# Patient Record
Sex: FEMALE | Race: WHITE | NOT HISPANIC OR LATINO | URBAN - METROPOLITAN AREA
[De-identification: names, ages, dates, MRNs, and addresses within clinical notes are randomized per-mention and may not be internally consistent; named-entity substitution may affect disease eponyms.]

---
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
## 2021-06-13 ENCOUNTER — HOSPITAL ENCOUNTER (EMERGENCY)
Facility: HOSPITAL | Age: 21
Discharge: LEFT BEFORE DISCHARGE | End: 2021-06-14

## 2021-06-13 VITALS — OXYGEN SATURATION: 98 % | TEMPERATURE: 97.1 F | HEART RATE: 111 BPM | RESPIRATION RATE: 16 BRPM

## 2021-06-13 LAB
BASOPHILS # BLD AUTO: 0.03 10*3/MM3 (ref 0–0.2)
BASOPHILS NFR BLD AUTO: 0.3 % (ref 0–1.5)
DEPRECATED RDW RBC AUTO: 49.4 FL (ref 37–54)
EOSINOPHIL # BLD AUTO: 0.03 10*3/MM3 (ref 0–0.4)
EOSINOPHIL NFR BLD AUTO: 0.3 % (ref 0.3–6.2)
ERYTHROCYTE [DISTWIDTH] IN BLOOD BY AUTOMATED COUNT: 13.6 % (ref 12.3–15.4)
HCT VFR BLD AUTO: 38.8 % (ref 34–46.6)
HGB BLD-MCNC: 12.9 G/DL (ref 12–15.9)
IMM GRANULOCYTES # BLD AUTO: 0.03 10*3/MM3 (ref 0–0.05)
IMM GRANULOCYTES NFR BLD AUTO: 0.3 % (ref 0–0.5)
LYMPHOCYTES # BLD AUTO: 2.1 10*3/MM3 (ref 0.7–3.1)
LYMPHOCYTES NFR BLD AUTO: 22.3 % (ref 19.6–45.3)
MCH RBC QN AUTO: 32.3 PG (ref 26.6–33)
MCHC RBC AUTO-ENTMCNC: 33.2 G/DL (ref 31.5–35.7)
MCV RBC AUTO: 97.2 FL (ref 79–97)
MONOCYTES # BLD AUTO: 0.62 10*3/MM3 (ref 0.1–0.9)
MONOCYTES NFR BLD AUTO: 6.6 % (ref 5–12)
NEUTROPHILS NFR BLD AUTO: 6.6 10*3/MM3 (ref 1.7–7)
NEUTROPHILS NFR BLD AUTO: 70.2 % (ref 42.7–76)
NRBC BLD AUTO-RTO: 0 /100 WBC (ref 0–0.2)
PLATELET # BLD AUTO: 174 10*3/MM3 (ref 140–450)
PMV BLD AUTO: 10.8 FL (ref 6–12)
RBC # BLD AUTO: 3.99 10*6/MM3 (ref 3.77–5.28)
WBC # BLD AUTO: 9.41 10*3/MM3 (ref 3.4–10.8)

## 2021-06-13 PROCEDURE — 80053 COMPREHEN METABOLIC PANEL: CPT | Performed by: EMERGENCY MEDICINE

## 2021-06-13 PROCEDURE — 85025 COMPLETE CBC W/AUTO DIFF WBC: CPT | Performed by: EMERGENCY MEDICINE

## 2021-06-13 PROCEDURE — 99211 OFF/OP EST MAY X REQ PHY/QHP: CPT

## 2021-06-13 PROCEDURE — 84702 CHORIONIC GONADOTROPIN TEST: CPT | Performed by: EMERGENCY MEDICINE

## 2021-06-13 PROCEDURE — 83690 ASSAY OF LIPASE: CPT | Performed by: EMERGENCY MEDICINE

## 2021-06-14 LAB
ALBUMIN SERPL-MCNC: 3.7 G/DL (ref 3.5–5.2)
ALBUMIN/GLOB SERPL: 1.3 G/DL
ALP SERPL-CCNC: 50 U/L (ref 39–117)
ALT SERPL W P-5'-P-CCNC: 6 U/L (ref 1–33)
ANION GAP SERPL CALCULATED.3IONS-SCNC: 9.1 MMOL/L (ref 5–15)
AST SERPL-CCNC: 10 U/L (ref 1–32)
BILIRUB SERPL-MCNC: 0.3 MG/DL (ref 0–1.2)
BUN SERPL-MCNC: 4 MG/DL (ref 6–20)
BUN/CREAT SERPL: 6.7 (ref 7–25)
CALCIUM SPEC-SCNC: 9.1 MG/DL (ref 8.6–10.5)
CHLORIDE SERPL-SCNC: 103 MMOL/L (ref 98–107)
CO2 SERPL-SCNC: 22.9 MMOL/L (ref 22–29)
CREAT SERPL-MCNC: 0.6 MG/DL (ref 0.57–1)
GFR SERPL CREATININE-BSD FRML MDRD: 127 ML/MIN/1.73
GLOBULIN UR ELPH-MCNC: 2.9 GM/DL
GLUCOSE SERPL-MCNC: 85 MG/DL (ref 65–99)
HCG INTACT+B SERPL-ACNC: NORMAL MIU/ML
LIPASE SERPL-CCNC: 18 U/L (ref 13–60)
POTASSIUM SERPL-SCNC: 3.5 MMOL/L (ref 3.5–5.2)
PROT SERPL-MCNC: 6.6 G/DL (ref 6–8.5)
SODIUM SERPL-SCNC: 135 MMOL/L (ref 136–145)

## 2021-06-14 NOTE — ED NOTES
pt to ER via PV from [facility] sent here for further treatment. pt has had blood in urine and dysuria. pt had a positive home pregnancy test several days ago. pt i nickie in triage. Triage in appropriate PPE     Dara Sun RN  06/13/21 [id number]

## 2021-06-21 ENCOUNTER — INITIAL PRENATAL (OUTPATIENT)
Dept: OBSTETRICS AND GYNECOLOGY | Facility: CLINIC | Age: 21
End: 2021-06-21

## 2021-06-21 ENCOUNTER — TELEPHONE (OUTPATIENT)
Dept: OBSTETRICS AND GYNECOLOGY | Facility: CLINIC | Age: 21
End: 2021-06-21

## 2021-06-21 VITALS — WEIGHT: 157 LBS | BODY MASS INDEX: 26.13 KG/M2 | DIASTOLIC BLOOD PRESSURE: 66 MMHG | SYSTOLIC BLOOD PRESSURE: 115 MMHG

## 2021-06-21 DIAGNOSIS — O21.9 NAUSEA AND VOMITING IN PREGNANCY: ICD-10-CM

## 2021-06-21 DIAGNOSIS — Z34.91 UNCERTAIN DATES, ANTEPARTUM, FIRST TRIMESTER: ICD-10-CM

## 2021-06-21 DIAGNOSIS — Z11.3 SCREEN FOR STD (SEXUALLY TRANSMITTED DISEASE): ICD-10-CM

## 2021-06-21 DIAGNOSIS — Z34.81 ENCOUNTER FOR SUPERVISION OF OTHER NORMAL PREGNANCY IN FIRST TRIMESTER: Primary | ICD-10-CM

## 2021-06-21 DIAGNOSIS — Z13.71 SCREENING FOR GENETIC DISEASE CARRIER STATUS: ICD-10-CM

## 2021-06-21 LAB
GLUCOSE UR STRIP-MCNC: NEGATIVE MG/DL
PROT UR STRIP-MCNC: ABNORMAL MG/DL

## 2021-06-21 PROCEDURE — 99214 OFFICE O/P EST MOD 30 MIN: CPT | Performed by: OBSTETRICS & GYNECOLOGY

## 2021-06-21 RX ORDER — SWAB
1 SWAB, NON-MEDICATED MISCELLANEOUS DAILY
Qty: 90 EACH | Refills: 3 | Status: SHIPPED | OUTPATIENT
Start: 2021-06-21

## 2021-06-21 RX ORDER — PROMETHAZINE HYDROCHLORIDE 25 MG/1
25 TABLET ORAL EVERY 6 HOURS PRN
Qty: 30 TABLET | Refills: 2 | Status: SHIPPED | OUTPATIENT
Start: 2021-06-21 | End: 2021-10-27

## 2021-06-21 NOTE — PROGRESS NOTES
Initial ob visit     CC- Here for care of pregnancy     Elise Crockett is being seen today for her first obstetrical visit.  She is a 21 y.o.    9w4d gestation.     # 1 - Date: 20, Sex: Female, Weight: 2970 g (6 lb 8.8 oz), GA: 39w5d, Delivery: Vaginal, Spontaneous, Apgar1: 8, Apgar5: 9, Living: Living, Birth Comments: scale 3    # 2 - Date: None, Sex: None, Weight: None, GA: None, Delivery: None, Apgar1: None, Apgar5: None, Living: None, Birth Comments: None      Current obstetric complaints : none   Duration/severity of complaints:   Initial positive test date : 1.5 weeks ago     Location : @ home    Prior obstetric issues, potential pregnancy concerns: none  Family history of genetic issues (includes FOB): none  Prior infections concerning in pregnancy (Rash, fever in last 2 weeks): none  Varicella Hx -immunity  Prior testing for Cystic Fibrosis Carrier or Sickle Cell Trait- unknown status  Prepregnancy BMI - Body mass index is 26.13 kg/m².  Hx of HSV for patient or partner : No     Past Medical History:   Diagnosis Date   • Chlamydia        Past Surgical History:   Procedure Laterality Date   • WISDOM TOOTH EXTRACTION           Current Outpatient Medications:   •  Prenatal 28-0.8 MG tablet, Take 1 tablet by mouth Daily. Please use formulary or generic, with DHA ideal, Disp: 90 each, Rfl: 3  •  promethazine (PHENERGAN) 25 MG tablet, Take 1 tablet by mouth Every 6 (Six) Hours As Needed for Nausea or Vomiting., Disp: 30 tablet, Rfl: 2    No Known Allergies    Social History     Socioeconomic History   • Marital status: Single     Spouse name: Not on file   • Number of children: Not on file   • Years of education: Not on file   • Highest education level: Not on file   Tobacco Use   • Smoking status: Never Smoker   • Smokeless tobacco: Never Used   Substance and Sexual Activity   • Alcohol use: No   • Drug use: No   • Sexual activity: Yes     Partners: Male       Family History   Problem Relation Age of  Onset   • Colon cancer Maternal Grandfather    • Deep vein thrombosis Maternal Great-Grandmother    • Breast cancer Neg Hx    • Ovarian cancer Neg Hx    • Uterine cancer Neg Hx    • Pulmonary embolism Neg Hx        Review of systems     Constitutional : Nausea, fatigue nausea present, fatigue mild    : Vaginal bleeding, cramping no bleeding or cramping   Breast Tenderness : none   All other systems reviewed and negative        Objective    /66   Wt 71.2 kg (157 lb)   LMP 04/15/2021 (Approximate)   BMI 26.13 kg/m²       General Appearance:    Alert, cooperative, in no acute distress, habitus Normal   Head:    Normocephalic, without obvious abnormality, atraumatic   Eyes:            Lids and lashes normal, conjunctivae and sclerae normal, no   icterus, no pallor, corneas clear   Ears:    Ears appear intact with no abnormalities noted       Neck:   No adenopathy, supple, trachea midline, no thyromegaly   Back:     No kyphosis present, no scoliosis present,                       Lungs:     Clear to auscultation,respirations regular, even and     unlabored    Heart:    Regular rhythm and normal rate, normal S1 and S2, no            murmur, no gallop, no rub, no click   Breast Exam:    No masses, No nipple discharge   Abdomen:     Normal bowel sounds, no masses, no organomegaly, soft        non-tender, non-distended, no guarding, no rebound                 tenderness   Genitalia:    Vulva - BUS-WNL, NEFG    Vagina - No discharge, No bleeding    Cervix - No Lesions, closed     Uterus - Consistent with 8-9 weeks, anteverted     Adnexa - No mass, NT    Pelvimetry - clinically adequate, gynecoid pelvis     Extremities:   Moves all extremities well, no edema, no cyanosis, no              redness   Pulses:   Pulses palpable and equal bilaterally   Skin:   No bleeding, bruising or rash   Lymph nodes:   No palpable adenopathy   Neurologic:   Sensation intact, A&O times 3      Assessment & Plan     Diagnoses and all  orders for this visit:    1. Encounter for supervision of other normal pregnancy in first trimester (Primary)  -     OB Panel With HIV  -     Urine Culture - , Urine, Clean Catch    2. Uncertain dates, antepartum, first trimester  -      Ob Transvaginal    3. Nausea and vomiting in pregnancy    4. Screen for STD (sexually transmitted disease)  -     Chlamydia trachomatis, Neisseria gonorrhoeae, Trichomonas vaginalis, PCR - Swab, Vagina    5. Screening for genetic disease carrier status  -     Inher[employer] Society Guided - Blood,    Other orders  -     promethazine (PHENERGAN) 25 MG tablet; Take 1 tablet by mouth Every 6 (Six) Hours As Needed for Nausea or Vomiting.  Dispense: 30 tablet; Refill: 2  -     Prenatal 28-0.8 MG tablet; Take 1 tablet by mouth Daily. Please use formulary or generic, with DHA ideal  Dispense: 90 each; Refill: 3        1) Pregnancy at 9w4d  2) Nausea in pregnancy   Trial of phenergan   Discussed evolution of nausea in first trimester   3) Uncertain dates   Check dating scan        Activity recommendation : 150 minutes/week of moderate intensity aerobic activity unless we limit for bleeding, hypertension or other pregnancy complication   Patient is on Prenatal vitamins  Problem list reviewed and updated.  Reviewed routine prenatal care with the office to include but not limited to   Zika (travel restrictions/ok to use insect repellant), not to changing cat litter, food restrictions, avoidance of alcohol, tobacco and drugs and saunas/hot tubs.   All questions answered.     To Larose MD   6/21/2021  14:59 EDT

## 2021-06-21 NOTE — TELEPHONE ENCOUNTER
Vijaya,     Ultrasound did change EDC to 1/26/22 (it was too different from LN)  Please let her know.     Thanks,   Dr. Larose

## 2021-06-22 LAB
ABO GROUP BLD: ABNORMAL
BASOPHILS # BLD AUTO: 0.1 X10E3/UL (ref 0–0.2)
BASOPHILS NFR BLD AUTO: 1 %
BLD GP AB SCN SERPL QL: NEGATIVE
EOSINOPHIL # BLD AUTO: 0.1 X10E3/UL (ref 0–0.4)
EOSINOPHIL NFR BLD AUTO: 1 %
ERYTHROCYTE [DISTWIDTH] IN BLOOD BY AUTOMATED COUNT: 12.7 % (ref 11.7–15.4)
HBV SURFACE AG SERPL QL IA: NEGATIVE
HCT VFR BLD AUTO: 37.3 % (ref 34–46.6)
HCV AB S/CO SERPL IA: <0.1 S/CO RATIO (ref 0–0.9)
HGB BLD-MCNC: 12.3 G/DL (ref 11.1–15.9)
HIV 1+2 AB+HIV1 P24 AG SERPL QL IA: NON REACTIVE
IMM GRANULOCYTES # BLD AUTO: 0 X10E3/UL (ref 0–0.1)
IMM GRANULOCYTES NFR BLD AUTO: 0 %
LYMPHOCYTES # BLD AUTO: 1.9 X10E3/UL (ref 0.7–3.1)
LYMPHOCYTES NFR BLD AUTO: 19 %
MCH RBC QN AUTO: 30.6 PG (ref 26.6–33)
MCHC RBC AUTO-ENTMCNC: 33 G/DL (ref 31.5–35.7)
MCV RBC AUTO: 93 FL (ref 79–97)
MONOCYTES # BLD AUTO: 0.6 X10E3/UL (ref 0.1–0.9)
MONOCYTES NFR BLD AUTO: 6 %
NEUTROPHILS # BLD AUTO: 7.1 X10E3/UL (ref 1.4–7)
NEUTROPHILS NFR BLD AUTO: 73 %
PLATELET # BLD AUTO: 267 X10E3/UL (ref 150–450)
RBC # BLD AUTO: 4.02 X10E6/UL (ref 3.77–5.28)
RH BLD: NEGATIVE
RPR SER QL: NON REACTIVE
RUBV IGG SERPL IA-ACNC: 2.28 INDEX
WBC # BLD AUTO: 9.8 X10E3/UL (ref 3.4–10.8)

## 2021-06-22 NOTE — TELEPHONE ENCOUNTER
Good afternoon,  Pt is returning your call and would like a call back. Please advise.  Thanks,   Poonam

## 2021-06-23 LAB
BACTERIA UR CULT: NORMAL
BACTERIA UR CULT: NORMAL
C TRACH RRNA SPEC QL NAA+PROBE: NEGATIVE
N GONORRHOEA RRNA SPEC QL NAA+PROBE: NEGATIVE
T VAGINALIS DNA SPEC QL NAA+PROBE: NEGATIVE

## 2021-07-21 ENCOUNTER — TELEPHONE (OUTPATIENT)
Dept: OBSTETRICS AND GYNECOLOGY | Facility: CLINIC | Age: 21
End: 2021-07-21

## 2021-07-29 ENCOUNTER — TELEPHONE (OUTPATIENT)
Dept: OBSTETRICS AND GYNECOLOGY | Facility: CLINIC | Age: 21
End: 2021-07-29

## 2021-07-29 NOTE — TELEPHONE ENCOUNTER
Dr. Larose,     Patient called and wanted to know if there was another prenatal that passport would cover?     Please Advise,   Thanks Gill

## 2021-07-29 NOTE — TELEPHONE ENCOUNTER
"Gill,     Call pharmacy on her behalf and ask them to change Rx to ANY PRENATAL vitamin that is under formulary for passport. In Epic I can not send a true \"generic\" - I have to use only some formulation. But if it says prenatal I am good with it. Let her know when done.     Thanks,   Dr. Larose  "

## 2021-07-30 ENCOUNTER — TELEPHONE (OUTPATIENT)
Dept: OBSTETRICS AND GYNECOLOGY | Facility: CLINIC | Age: 21
End: 2021-07-30

## 2021-07-30 NOTE — TELEPHONE ENCOUNTER
Pharmacy contacted. Pharmacist said that she would change the prenatal vitamin to generic. Patient contacted & is aware.

## 2021-08-11 ENCOUNTER — TELEPHONE (OUTPATIENT)
Dept: OBSTETRICS AND GYNECOLOGY | Facility: CLINIC | Age: 21
End: 2021-08-11

## 2021-08-18 ENCOUNTER — ROUTINE PRENATAL (OUTPATIENT)
Dept: OBSTETRICS AND GYNECOLOGY | Facility: CLINIC | Age: 21
End: 2021-08-18

## 2021-08-18 VITALS — DIASTOLIC BLOOD PRESSURE: 62 MMHG | WEIGHT: 167 LBS | SYSTOLIC BLOOD PRESSURE: 105 MMHG | BODY MASS INDEX: 27.79 KG/M2

## 2021-08-18 DIAGNOSIS — Z36.89 ENCOUNTER FOR FETAL ANATOMIC SURVEY: ICD-10-CM

## 2021-08-18 DIAGNOSIS — Z34.82 ENCOUNTER FOR SUPERVISION OF OTHER NORMAL PREGNANCY IN SECOND TRIMESTER: Primary | ICD-10-CM

## 2021-08-18 DIAGNOSIS — O21.9 NAUSEA AND VOMITING IN PREGNANCY: ICD-10-CM

## 2021-08-18 LAB
GLUCOSE UR STRIP-MCNC: NEGATIVE MG/DL
PROT UR STRIP-MCNC: ABNORMAL MG/DL

## 2021-08-18 PROCEDURE — 99213 OFFICE O/P EST LOW 20 MIN: CPT | Performed by: OBSTETRICS & GYNECOLOGY

## 2021-08-18 NOTE — PROGRESS NOTES
OB follow up     Elise Crockett is a 21 y.o.  17w0d being seen today for her obstetrical visit.  Patient reports nausea. Fetal movement: absent.    Her prenatal care is complicated by (and status): nausea in pregnancy     Review of Systems  Cramping/contractions : none   Vaginal bleeding: none   Fetal movement none     /62   Wt 75.8 kg (167 lb)   LMP 04/15/2021 (Approximate)   BMI 27.79 kg/m²     FHT: 142 BPM   Uterine Size: 16 cm       Assessment    Diagnoses and all orders for this visit:    1. Encounter for supervision of other normal pregnancy in second trimester (Primary)    2. Nausea and vomiting in pregnancy    3. Encounter for fetal anatomic survey  -     US Ob 14 + Weeks Single or First Gestation; Future        1) pregnancy at 17w0d   Quickening reviewed.   COVID vaccine discussed - unvaccinated   Down syndrome screen discussed - Declined for now   Anatomy scan at 19-20 weeks         Plan    Reviewed this stage of pregnancy  Problem list updated   Follow up in 2 weeks    To Larose MD   2021  14:32 EDT

## 2021-09-01 ENCOUNTER — ROUTINE PRENATAL (OUTPATIENT)
Dept: OBSTETRICS AND GYNECOLOGY | Facility: CLINIC | Age: 21
End: 2021-09-01

## 2021-09-01 VITALS — BODY MASS INDEX: 28.96 KG/M2 | SYSTOLIC BLOOD PRESSURE: 112 MMHG | WEIGHT: 174 LBS | DIASTOLIC BLOOD PRESSURE: 64 MMHG

## 2021-09-01 DIAGNOSIS — Z34.82 ENCOUNTER FOR SUPERVISION OF OTHER NORMAL PREGNANCY IN SECOND TRIMESTER: Primary | ICD-10-CM

## 2021-09-01 LAB
GLUCOSE UR STRIP-MCNC: NEGATIVE MG/DL
PROT UR STRIP-MCNC: ABNORMAL MG/DL

## 2021-09-01 PROCEDURE — 99212 OFFICE O/P EST SF 10 MIN: CPT | Performed by: OBSTETRICS & GYNECOLOGY

## 2021-09-01 NOTE — PROGRESS NOTES
OB follow up     Elise Crockett is a 21 y.o.  19w0d being seen today for her obstetrical visit.  Patient reports no complaints. Fetal movement: normal.    Her prenatal care is complicated by (and status): uncomplicated     Review of Systems  Cramping/contractions : none   Vaginal bleeding: none   Fetal movement good     /64   Wt 78.9 kg (174 lb)   LMP 04/15/2021 (Approximate)   BMI 28.96 kg/m²     FHT: 156 BPM   Uterine Size: 19 cm       Assessment    Diagnoses and all orders for this visit:    1. Encounter for supervision of other normal pregnancy in second trimester (Primary)        1) pregnancy at 19w0d   Anatomy scan reviewed, essentially normal           Plan    Reviewed this stage of pregnancy  Problem list updated   Follow up in 4 weeks    To Larose MD   2021  11:14 EDT

## 2021-09-15 ENCOUNTER — TELEPHONE (OUTPATIENT)
Dept: OBSTETRICS AND GYNECOLOGY | Facility: HOSPITAL | Age: 21
End: 2021-09-15

## 2021-09-15 NOTE — TELEPHONE ENCOUNTER
Called pt to introduce [facility] Program to pt.  This was the 3rd  attempt to contact the pt.

## 2021-10-04 ENCOUNTER — TELEPHONE (OUTPATIENT)
Dept: OBSTETRICS AND GYNECOLOGY | Facility: HOSPITAL | Age: 21
End: 2021-10-04

## 2021-10-04 NOTE — TELEPHONE ENCOUNTER
Called pt to introduce [facility] Program to pt.  This was the 2nd attempt to contact the pt.  Pt accepted the program.  Intake scheduled 10/7/21  @ [id number].

## 2021-10-06 ENCOUNTER — ROUTINE PRENATAL (OUTPATIENT)
Dept: OBSTETRICS AND GYNECOLOGY | Facility: CLINIC | Age: 21
End: 2021-10-06

## 2021-10-06 ENCOUNTER — DOCUMENTATION (OUTPATIENT)
Dept: OBSTETRICS AND GYNECOLOGY | Facility: HOSPITAL | Age: 21
End: 2021-10-06

## 2021-10-06 VITALS — BODY MASS INDEX: 29.89 KG/M2 | DIASTOLIC BLOOD PRESSURE: 64 MMHG | WEIGHT: 179.6 LBS | SYSTOLIC BLOOD PRESSURE: 112 MMHG

## 2021-10-06 DIAGNOSIS — Z67.91 RH NEGATIVE STATUS DURING PREGNANCY IN SECOND TRIMESTER: ICD-10-CM

## 2021-10-06 DIAGNOSIS — Z34.82 ENCOUNTER FOR SUPERVISION OF OTHER NORMAL PREGNANCY IN SECOND TRIMESTER: Primary | ICD-10-CM

## 2021-10-06 DIAGNOSIS — Z36.9 ANTENATAL SCREENING ENCOUNTER: ICD-10-CM

## 2021-10-06 DIAGNOSIS — O26.892 RH NEGATIVE STATUS DURING PREGNANCY IN SECOND TRIMESTER: ICD-10-CM

## 2021-10-06 PROCEDURE — 99214 OFFICE O/P EST MOD 30 MIN: CPT | Performed by: OBSTETRICS & GYNECOLOGY

## 2021-10-06 NOTE — PROGRESS NOTES
OB follow up     Elise Crockett is a 21 y.o.  24w0d being seen today for her obstetrical visit.  Patient reports no complaints. Fetal movement: normal.    Her prenatal care is complicated by (and status): Uncomplicated     Review of Systems  Cramping/contractions : none   Vaginal bleeding: none   Fetal movement good     /64   Wt 81.5 kg (179 lb 9.6 oz)   LMP 04/15/2021 (Approximate)   BMI 29.89 kg/m²     FHT: 134 BPM   Uterine Size: 23 cm       Assessment    Diagnoses and all orders for this visit:    1. Encounter for supervision of other normal pregnancy in second trimester (Primary)    2. Rh negative status during pregnancy in second trimester  -     Antibody Screen; Future    3.  screening encounter  -     CBC & Differential; Future  -     Gestational Screen 1 Hr ([facility]); Future        1) pregnancy at 24w0d   Rhogam, 1 hour and CBC with next visit.   2) Rh negative   ABS as well.     Recommended flu and COVID vaccines.         Plan    Reviewed this stage of pregnancy  Problem list updated   Follow up in 3 weeks    To Larose MD   10/6/2021  13:36 EDT

## 2021-10-06 NOTE — NURSING NOTE
Met patient in person today and hand delivered [facility] Start-Up Kit.  Intake scheduled at 0830 on 10/07/2021.  Pt agrees.

## 2021-10-07 ENCOUNTER — TELEPHONE (OUTPATIENT)
Dept: OBSTETRICS AND GYNECOLOGY | Facility: HOSPITAL | Age: 21
End: 2021-10-07

## 2021-10-07 NOTE — TELEPHONE ENCOUNTER
Called pt to do intake.  Pt did not answer.  Message left for her to return my call at her convenience to complete intake.

## 2021-10-14 ENCOUNTER — TELEPHONE (OUTPATIENT)
Dept: OBSTETRICS AND GYNECOLOGY | Facility: HOSPITAL | Age: 21
End: 2021-10-14

## 2021-10-14 NOTE — TELEPHONE ENCOUNTER
Called pt to reschedule her intake for [facility]. Left msg. With contact info where pt could return my call.

## 2021-10-27 ENCOUNTER — ROUTINE PRENATAL (OUTPATIENT)
Dept: OBSTETRICS AND GYNECOLOGY | Facility: CLINIC | Age: 21
End: 2021-10-27

## 2021-10-27 VITALS — SYSTOLIC BLOOD PRESSURE: 123 MMHG | BODY MASS INDEX: 30.79 KG/M2 | WEIGHT: 185 LBS | DIASTOLIC BLOOD PRESSURE: 65 MMHG

## 2021-10-27 DIAGNOSIS — Z67.91 RH NEGATIVE STATUS DURING PREGNANCY IN SECOND TRIMESTER: ICD-10-CM

## 2021-10-27 DIAGNOSIS — O26.892 RH NEGATIVE STATUS DURING PREGNANCY IN SECOND TRIMESTER: ICD-10-CM

## 2021-10-27 DIAGNOSIS — Z34.82 ENCOUNTER FOR SUPERVISION OF OTHER NORMAL PREGNANCY IN SECOND TRIMESTER: Primary | ICD-10-CM

## 2021-10-27 LAB
GLUCOSE UR STRIP-MCNC: NEGATIVE MG/DL
PROT UR STRIP-MCNC: ABNORMAL MG/DL

## 2021-10-27 PROCEDURE — 99213 OFFICE O/P EST LOW 20 MIN: CPT | Performed by: OBSTETRICS & GYNECOLOGY

## 2021-10-27 PROCEDURE — 90686 IIV4 VACC NO PRSV 0.5 ML IM: CPT | Performed by: OBSTETRICS & GYNECOLOGY

## 2021-10-27 PROCEDURE — 90471 IMMUNIZATION ADMIN: CPT | Performed by: OBSTETRICS & GYNECOLOGY

## 2021-10-27 PROCEDURE — 96372 THER/PROPH/DIAG INJ SC/IM: CPT | Performed by: OBSTETRICS & GYNECOLOGY

## 2021-10-27 NOTE — PROGRESS NOTES
OB follow up     Elise Crockett is a 21 y.o.  27w0d being seen today for her obstetrical visit.  Patient reports no complaints. Fetal movement: normal.    Her prenatal care is complicated by (and status): uncomplicated     Review of Systems  Cramping/contractions : none   Vaginal bleeding: none   Fetal movement good     /65   Wt 83.9 kg (185 lb)   LMP 04/15/2021 (Approximate)   BMI 30.79 kg/m²     FHT: 144 BPM   Uterine Size: 26 cm       Assessment    Diagnoses and all orders for this visit:    1. Encounter for supervision of other normal pregnancy in second trimester (Primary)    2. Rh negative status during pregnancy in second trimester    Other orders  -     Rhogam Immune Globulin Immunization  -     FluLaval/Fluarix/Fluzone >6 Months        1) pregnancy at 27w0d   GTT/CBC today  Peds, prenatal classes and tours.   TDaP, flu and COVID vaccine recommend.   Encouraged questions about L&D, anesthesia, breast feeding and birth control   2) Rh negative , Flu and Rhogam injections given, no reaction noted        Plan    Reviewed this stage of pregnancy  Problem list updated   Follow up in 2 weeks    To Larose MD   10/27/2021  12:27 EDT

## 2021-10-28 RX ORDER — FERROUS SULFATE 325(65) MG
325 TABLET ORAL
Qty: 30 TABLET | Refills: 6 | Status: SHIPPED | OUTPATIENT
Start: 2021-10-28

## 2021-11-01 ENCOUNTER — TELEPHONE (OUTPATIENT)
Dept: OBSTETRICS AND GYNECOLOGY | Facility: CLINIC | Age: 21
End: 2021-11-01

## 2021-11-01 NOTE — TELEPHONE ENCOUNTER
"Scott Lilly,  Pt aware: \"Vijaya, Anemic on her BS/CBC screen. I sent in Iron to treat. Passed her glucose test. Please let her know. Thanks, Dr. Larose\"  "

## 2021-11-02 ENCOUNTER — TELEPHONE (OUTPATIENT)
Dept: OBSTETRICS AND GYNECOLOGY | Facility: CLINIC | Age: 21
End: 2021-11-02

## 2021-11-02 NOTE — TELEPHONE ENCOUNTER
----- Message from Vijaya Espinoza MA sent at 11/1/2021  4:21 PM EDT -----  L/m for pt/сергей  ----- Message -----  From: Vijaya Espinoza MA  Sent: 10/29/2021  12:06 PM EDT  To: Vijaya Espinoza MA    L/m for pt/сергей  ----- Message -----  From: Vijaya Espinoza MA  Sent: 10/28/2021   5:05 PM EDT  To: Vijaya Espinoza MA    L/m for pt/сергей  ----- Message -----  From: To Larose MD  Sent: 10/28/2021   4:04 PM EDT  To: JOANNE Greenberg, Anemic on her BS/CBC screen. I sent in Iron to treat. Passed her glucose test. Please let her know. Thanks, Dr. Larose

## 2021-11-10 ENCOUNTER — ROUTINE PRENATAL (OUTPATIENT)
Dept: OBSTETRICS AND GYNECOLOGY | Facility: CLINIC | Age: 21
End: 2021-11-10

## 2021-11-10 VITALS — BODY MASS INDEX: 30.62 KG/M2 | SYSTOLIC BLOOD PRESSURE: 123 MMHG | WEIGHT: 184 LBS | DIASTOLIC BLOOD PRESSURE: 74 MMHG

## 2021-11-10 DIAGNOSIS — O99.013 ANEMIA DURING PREGNANCY IN THIRD TRIMESTER: ICD-10-CM

## 2021-11-10 DIAGNOSIS — Z34.83 ENCOUNTER FOR SUPERVISION OF OTHER NORMAL PREGNANCY IN THIRD TRIMESTER: Primary | ICD-10-CM

## 2021-11-10 LAB
GLUCOSE UR STRIP-MCNC: NEGATIVE MG/DL
PROT UR STRIP-MCNC: ABNORMAL MG/DL

## 2021-11-10 PROCEDURE — 99213 OFFICE O/P EST LOW 20 MIN: CPT | Performed by: OBSTETRICS & GYNECOLOGY

## 2021-11-10 NOTE — PROGRESS NOTES
OB follow up     Elise Crockett is a 21 y.o.  29w0d being seen today for her obstetrical visit.  Patient reports no complaints. Fetal movement: normal.    Her prenatal care is complicated by (and status): uncomplicated     Review of Systems  Cramping/contractions : none   Vaginal bleeding: none  Fetal movement good     /74   Wt 83.5 kg (184 lb)   LMP 04/15/2021 (Approximate)   BMI 30.62 kg/m²     FHT: 134 BPM   Uterine Size: 27 cm       Assessment    Diagnoses and all orders for this visit:    1. Encounter for supervision of other normal pregnancy in third trimester (Primary)    2. Anemia during pregnancy in third trimester        1) pregnancy at 29w0d   Rhogram done (did not do ABS as ordered??)  Not diabetic   2) Anemia in pregnancy   Mild, but starting oral iron   Reviewed why we concentrate on this.       Plan    Reviewed this stage of pregnancy  Problem list updated   Follow up in 2 weeks    To Larose MD   11/10/2021  14:14 EST

## 2021-12-17 ENCOUNTER — DOCUMENTATION (OUTPATIENT)
Dept: LABOR AND DELIVERY | Facility: HOSPITAL | Age: 21
End: 2021-12-17

## 2021-12-17 ENCOUNTER — TELEPHONE (OUTPATIENT)
Dept: LABOR AND DELIVERY | Facility: HOSPITAL | Age: 21
End: 2021-12-17

## 2021-12-17 NOTE — NURSING NOTE
Received an email from Sunshine Mcdonald at the [place] office about contacting the patient because she had been unable to make her appts.  Called pt today.    The following email was sent back to Sunshine concerning the pt.      I spoke to the patient today and she states she does not need transportation from me at this time.  She states her last visit was a problem due to it being in a different location and that she has no transportation issues for her upcoming appt.  Thank you.

## 2021-12-17 NOTE — TELEPHONE ENCOUNTER
Maternal Child Initial Intake    Patient Name: Elise Crockett     Preferred Name: Elise     YOB: 2000     Patient E-mail Address: [address]                Patient gives permission for information/resources to be emailed: no (via [employer]..the patient has [employer])    Currently Employed: Yes, describe: [employer] 4 hour shifts 6 days a weeks     How well do you speak English? Very Well     Services: No    Language Spoken/Preferred English    Willingness to participate in [employer]: yes    Home Phone: na  Cell Phone: [phone]  Alternate/Work/School Phone: na  Best Time for Contacting: before 11 or after 3pm  Best Method for Contacting: Cell  May we contact you by phone: yes  May we contact you by mail: yes  Highest Level of Education to Date: Some college credit but no degree    Professional Contacts  Type of Provider Name Next Appointment   OB/GYN Provider:     Primary Care Provider:       Dental Provider:     Speciality Provider:      Pediatrician Chosen         Have you seen dentist in last 6 months: [] YES    [x] NO    Other Providers/Services Presently Utilizing:   WIC (Women, Infant, Children)    Pregnancy Confirmed: Yes  Patient's last menstrual period was 04/15/2021 (approximate). Patient's last menstrual period was 04/15/2021 (approximate).   LELA: 1/26/2022 Based Upon U/S (GA at U/S 34+2 Now)  Feeding Plan: [] Breast [x] Bottle  Current Pregnancy Related Symptoms, Concerns, or Questions: none  No Known Allergies   Prior to Admission medications    Medication Sig Start Date End Date Taking? Authorizing Provider   ferrous sulfate 325 (65 FE) MG tablet Take 1 tablet by mouth Daily With Breakfast. 10/28/21   To Larose MD   Prenatal 28-0.8 MG tablet Take 1 tablet by mouth Daily. Please use formulary or generic, with DHA ideal 6/21/21   To Larose MD      OB/GYN Specific History:   None    Patient's last menstrual period was 04/15/2021 (approximate).   Estimated Date of  Delivery: 22   Pregnancy History:   Social History:   Sexually Active: Yes Partners: Male  Birth Control/Protection Post Delivery: [place]    Pregnancy History:  Current Pregnancy Baby Sex: Male, undecided   Date: GA (wks) Birth Wt Sex Del Type Place of Del Comments/Complications Living (Y/N)                                                                           Past Medical History:   Diagnosis Date   • Chlamydia       Past Surgical History:   Procedure Laterality Date   • WISDOM TOOTH EXTRACTION        Implants: none  Family History   Problem Relation Age of Onset   • Colon cancer Maternal Grandfather    • Deep vein thrombosis Maternal Great-Grandmother    • Breast cancer Neg Hx    • Ovarian cancer Neg Hx    • Uterine cancer Neg Hx    • Pulmonary embolism Neg Hx         Social History:  Smoking Status: Never a smoker.  Cigarettes   Passive Exposure: no  Counseling Given: No  Smokeless Tobacco: Never   Alcohol Use: No   Drinks/wk: 0   Substance Use History: No  Substances Used & Use/Wk: 0    [place] Domestic Violence/Abuse Screening  Within the last year, have you been:  Humiliated or emotionally abused in other ways by your partner or ex-partner no  Afraid of your partner or ex-partner no  Raped or forced to have any kind of sexual activity by your partner or ex-partner no  Kicked, hit, slapped, or otherwise physically hurt by your partner or ex-partner no  Feels Unsafe at home or work/school: no  Feels Threatened by Someone no  Does anyone try to keep you from having contact with others/doing things outside your home: no  History of physical, mental, emotional, financial abuse/neglect: no    Spiritual, Cultural, Religous, Value Awareness  Any Spiritual, Cultural, or Catholic Beliefs/Practices/Values that we need to be mindful of throughout your maternity experience: no    Resource/Environmental Concerns:  Current Living Arrangement: home/apt/condo  Resource/Environmental Concerns:    None    Disability/Function:  Do you have:  Difficulty Hearing or Deaf: no  Difficulty Seeing or Blind: no  Difficulty Concentrating or with Decision-Making: no  Difficulty Communicating: no  Difficulty with Comprehension/Understanding of Information Provided: no  Difficulty with Performing Activities of Daily Living: no    Accommodations/Assistive Devices Utilized (e.g. hearing aids, sign language, braille, service animal, /aide, etc.): none    Equipment Presently Utilized/Available at Home: Other none  Education:   Preferred Language for Discussing Healthcare: English  Ability to Read: yes  Ability to Write: yes    Preferred learning method: reading  Learning Barriers: none  Topic Education Information Comments        Hospital Education Programs [x] Provided                         [] Acknowledged                [] Refused    [facility] [x] Provided                         [] Acknowledged                [] Refused    Signs or Symptoms to Report [x] Provided                         [] Acknowledged                [] Refused    Other:  [x] Provided                         [] Acknowledged                [] Refused  Start-up Kit sent, along with member services number for Passport to call about car seat, Birth Plan, Fetal Kick Counts, and Kangaroo Care also sent   Comments:       Significant Other/Support Person: Name: Jeromy   Relationship: Partner  Do you have [employer]?  [x] YES    [] NO   Specific Resources Provided and Method: HANDS, Insurance Benefits/Incentives Sent via: [employer]    Do you have the [employer] [place]?  [] YES    [x] NO     Intake Summary   [x] Intake completed, will follow-up with patient: December 23, 2021 @ 0800  [x] Discussed community resources and information provided or assisted with appointments (see notes)  [] Other, including any provider notifications (see notes)  [] Declines [employer] Participation  BARBARA Hutchins   Maternal Nurse  Navigator

## 2021-12-22 ENCOUNTER — TELEPHONE (OUTPATIENT)
Dept: LABOR AND DELIVERY | Facility: HOSPITAL | Age: 21
End: 2021-12-22

## 2021-12-22 NOTE — TELEPHONE ENCOUNTER
Called pt for Check In #3 and pt did not answer and her voicemail is not set up.  Will reach out via [employer].

## 2021-12-23 ENCOUNTER — ROUTINE PRENATAL (OUTPATIENT)
Dept: OBSTETRICS AND GYNECOLOGY | Facility: CLINIC | Age: 21
End: 2021-12-23

## 2021-12-23 VITALS — DIASTOLIC BLOOD PRESSURE: 64 MMHG | WEIGHT: 196 LBS | SYSTOLIC BLOOD PRESSURE: 126 MMHG | BODY MASS INDEX: 32.62 KG/M2

## 2021-12-23 DIAGNOSIS — O99.013 ANEMIA DURING PREGNANCY IN THIRD TRIMESTER: ICD-10-CM

## 2021-12-23 DIAGNOSIS — Z34.83 ENCOUNTER FOR SUPERVISION OF OTHER NORMAL PREGNANCY IN THIRD TRIMESTER: Primary | ICD-10-CM

## 2021-12-23 DIAGNOSIS — O26.843 FUNDAL HEIGHT LOW FOR DATES IN THIRD TRIMESTER: ICD-10-CM

## 2021-12-23 LAB
GLUCOSE UR STRIP-MCNC: NEGATIVE MG/DL
PROT UR STRIP-MCNC: ABNORMAL MG/DL

## 2021-12-23 PROCEDURE — 99213 OFFICE O/P EST LOW 20 MIN: CPT | Performed by: OBSTETRICS & GYNECOLOGY

## 2021-12-23 NOTE — PROGRESS NOTES
Ob follow up    Elise Crockett is a 21 y.o.  35w1d patient being seen today for her obstetrical visit. Patient reports no complaints. Fetal movement: normal.  Pt is requesting a weight limit for her job. 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     Her prenatal care is complicated by (and status) : Anemia, missed prenatal appointments       ROS -   Fetal Movement good   Vaginal bleeding none   Cramping/Contractions none     /64   Wt 88.9 kg (196 lb)   LMP 04/15/2021 (Approximate)   BMI 32.62 kg/m²     FHT:  132 BPM    Uterine Size: 32 cm   Presentations: unsure   Pelvic Exam:     Dilation: deferred    Effacement: deferred    Station:  deferred                 Assessment    Diagnoses and all orders for this visit:    1. Encounter for supervision of other normal pregnancy in third trimester (Primary)    2. Anemia during pregnancy in third trimester    3. Fundal height low for dates in third trimester  -     US Ob Follow Up Transabdominal Approach        1) Pregnancy at 35w1d  2) Fetal status reassuring   3) GBS status - to do @ 36 weeks  4) Anemia - On oral iron, mild   5) FH low (missed last few visits)  Check growth next week   Encouraged compliance    Requested restriction with lifting   Typically use 35# in third trimester  Reviewed issues related to employment.     Plan    Labor warnings   [facility] BID        To Larose MD   2021  15:57 EST

## 2021-12-27 ENCOUNTER — TELEPHONE (OUTPATIENT)
Dept: LABOR AND DELIVERY | Facility: HOSPITAL | Age: 21
End: 2021-12-27

## 2021-12-27 ENCOUNTER — ROUTINE PRENATAL (OUTPATIENT)
Dept: OBSTETRICS AND GYNECOLOGY | Facility: CLINIC | Age: 21
End: 2021-12-27

## 2021-12-27 VITALS — DIASTOLIC BLOOD PRESSURE: 77 MMHG | WEIGHT: 196 LBS | BODY MASS INDEX: 32.62 KG/M2 | SYSTOLIC BLOOD PRESSURE: 123 MMHG

## 2021-12-27 DIAGNOSIS — O99.013 ANEMIA DURING PREGNANCY IN THIRD TRIMESTER: ICD-10-CM

## 2021-12-27 DIAGNOSIS — O36.5939 SGA (SMALL FOR GESTATIONAL AGE), FETAL, AFFECTING CARE OF MOTHER, ANTEPARTUM, THIRD TRIMESTER, OTHER FETUS: ICD-10-CM

## 2021-12-27 DIAGNOSIS — Z34.83 ENCOUNTER FOR SUPERVISION OF OTHER NORMAL PREGNANCY IN THIRD TRIMESTER: Primary | ICD-10-CM

## 2021-12-27 DIAGNOSIS — O26.843 FUNDAL HEIGHT LOW FOR DATES IN THIRD TRIMESTER: ICD-10-CM

## 2021-12-27 LAB
GLUCOSE UR STRIP-MCNC: NEGATIVE MG/DL
PROT UR STRIP-MCNC: ABNORMAL MG/DL

## 2021-12-27 PROCEDURE — 99213 OFFICE O/P EST LOW 20 MIN: CPT | Performed by: OBSTETRICS & GYNECOLOGY

## 2021-12-27 NOTE — PROGRESS NOTES
Ob follow up    Elise Crockett is a 21 y.o.  35w5d patient being seen today for her obstetrical visit. Patient reports no complaints. Fetal movement: normal.    Her prenatal care is complicated by (and status) : Anemia, SGA      ROS -   Fetal Movement good   Vaginal bleeding none   Cramping/Contractions none     /77   Wt 88.9 kg (196 lb)   LMP 04/15/2021 (Approximate)   BMI 32.62 kg/m²     FHT:  134 BPM    Uterine Size: 32 cm   Presentations: cephalic   Pelvic Exam:     Dilation: deferred    Effacement: deferred    Station:  deferred                 Assessment    Diagnoses and all orders for this visit:    1. Encounter for supervision of other normal pregnancy in third trimester (Primary)    2. Anemia during pregnancy in third trimester    3. Fundal height low for dates in third trimester    4. SGA (small for gestational age), fetal, affecting care of mother, antepartum, third trimester, other fetus        1) Pregnancy at 35w5d  2) Fetal status reassuring   3) GBS status - Do next week.   4) Anemia, mild and stable on oral iron  5) SGA/FH low - growth reasonable at 21%, A/C 31%, Normal ZULEIKA and cephalic       Plan    Labor warnings   [facility] BID        To Larose MD   2021  12:35 EST

## 2021-12-27 NOTE — TELEPHONE ENCOUNTER
Called pt to do Check In Visit #2.  Pt did not answer.  Left msg for pt to contact me via her convenience.

## 2022-01-03 ENCOUNTER — ROUTINE PRENATAL (OUTPATIENT)
Dept: OBSTETRICS AND GYNECOLOGY | Facility: CLINIC | Age: 22
End: 2022-01-03

## 2022-01-03 VITALS — DIASTOLIC BLOOD PRESSURE: 66 MMHG | BODY MASS INDEX: 33.12 KG/M2 | WEIGHT: 199 LBS | SYSTOLIC BLOOD PRESSURE: 113 MMHG

## 2022-01-03 DIAGNOSIS — Z34.83 ENCOUNTER FOR SUPERVISION OF OTHER NORMAL PREGNANCY IN THIRD TRIMESTER: Primary | ICD-10-CM

## 2022-01-03 DIAGNOSIS — Z36.9 ANTENATAL SCREENING ENCOUNTER: ICD-10-CM

## 2022-01-03 DIAGNOSIS — O36.5939 SGA (SMALL FOR GESTATIONAL AGE), FETAL, AFFECTING CARE OF MOTHER, ANTEPARTUM, THIRD TRIMESTER, OTHER FETUS: ICD-10-CM

## 2022-01-03 DIAGNOSIS — O22.43 HEMORRHOIDS DURING PREGNANCY IN THIRD TRIMESTER: ICD-10-CM

## 2022-01-03 DIAGNOSIS — O99.013 ANEMIA DURING PREGNANCY IN THIRD TRIMESTER: ICD-10-CM

## 2022-01-03 LAB
GLUCOSE UR STRIP-MCNC: NEGATIVE MG/DL
PROT UR STRIP-MCNC: ABNORMAL MG/DL

## 2022-01-03 PROCEDURE — 99213 OFFICE O/P EST LOW 20 MIN: CPT | Performed by: OBSTETRICS & GYNECOLOGY

## 2022-01-03 RX ORDER — HYDROCORTISONE 25 MG/G
CREAM TOPICAL
Qty: 30 G | Refills: 1 | Status: SHIPPED | OUTPATIENT
Start: 2022-01-03 | End: 2023-01-03

## 2022-01-03 NOTE — PROGRESS NOTES
Ob follow up    Elise Crockett is a 21 y.o.  36w5d patient being seen today for her obstetrical visit. Patient reports no complaints. Fetal movement: normal.    Her prenatal care is complicated by (and status) : anemia and SGA      ROS -   Fetal Movement normal  Vaginal bleeding none   Cramping/Contractions none      /66   Wt 90.3 kg (199 lb)   LMP 04/15/2021 (Approximate)   BMI 33.12 kg/m²     FHT:  144 BPM    Uterine Size: 34 cm   Presentations: cephalic   Pelvic Exam:     Dilation: 1cm    Effacement: 25%    Station:  -2                 Assessment    Diagnoses and all orders for this visit:    1. Encounter for supervision of other normal pregnancy in third trimester (Primary)    2. Anemia during pregnancy in third trimester    3. SGA (small for gestational age), fetal, affecting care of mother, antepartum, third trimester, other fetus    4.  screening encounter  -     Strep B Screen - , Vaginal/Rectum    5. Hemorrhoids during pregnancy in third trimester    Other orders  -     Hydrocortisone, Perianal, (Anusol-HC) 2.5 % rectal cream; Apply rectally 2 times daily  Dispense: 30 g; Refill: 1        1) Pregnancy at 36w5d  2) Fetal status reassuring   3) GBS status - done today  4) SGA/FH low in pregnancy   Appropriate enough growth   5) mild anemia, stable on oral iron.   6) Hemorrhoid pain   Trial of anusol HC     Plan    Labor warnings   [facility] [place] [facility]        To Larose MD   1/3/2022  13:30 EST

## 2022-01-05 PROBLEM — O99.820 GBS (GROUP B STREPTOCOCCUS CARRIER), +RV CULTURE, CURRENTLY PREGNANT: Status: ACTIVE | Noted: 2022-01-05

## 2022-01-05 LAB — GP B STREP DNA SPEC QL NAA+PROBE: POSITIVE

## 2022-01-12 ENCOUNTER — ROUTINE PRENATAL (OUTPATIENT)
Dept: OBSTETRICS AND GYNECOLOGY | Facility: CLINIC | Age: 22
End: 2022-01-12

## 2022-01-12 VITALS — WEIGHT: 195 LBS | DIASTOLIC BLOOD PRESSURE: 77 MMHG | BODY MASS INDEX: 32.45 KG/M2 | SYSTOLIC BLOOD PRESSURE: 127 MMHG

## 2022-01-12 DIAGNOSIS — O99.820 GBS (GROUP B STREPTOCOCCUS CARRIER), +RV CULTURE, CURRENTLY PREGNANT: ICD-10-CM

## 2022-01-12 DIAGNOSIS — O36.5939 SGA (SMALL FOR GESTATIONAL AGE), FETAL, AFFECTING CARE OF MOTHER, ANTEPARTUM, THIRD TRIMESTER, OTHER FETUS: ICD-10-CM

## 2022-01-12 DIAGNOSIS — Z34.83 ENCOUNTER FOR SUPERVISION OF OTHER NORMAL PREGNANCY IN THIRD TRIMESTER: Primary | ICD-10-CM

## 2022-01-12 DIAGNOSIS — O99.013 ANEMIA DURING PREGNANCY IN THIRD TRIMESTER: ICD-10-CM

## 2022-01-12 LAB
GLUCOSE UR STRIP-MCNC: NEGATIVE MG/DL
PROT UR STRIP-MCNC: ABNORMAL MG/DL

## 2022-01-12 PROCEDURE — 99213 OFFICE O/P EST LOW 20 MIN: CPT | Performed by: OBSTETRICS & GYNECOLOGY

## 2022-01-12 NOTE — PROGRESS NOTES
Ob follow up    Elise Crockett is a 21 y.o.  38w0d patient being seen today for her obstetrical visit. Patient reports no complaints. Fetal movement: normal.    Her prenatal care is complicated by (and status) : anemia and SGA      ROS -   Fetal Movement good   Vaginal bleeding none   Cramping/Contractions none      Wt 88.5 kg (195 lb)   LMP 04/15/2021 (Approximate)   BMI 32.45 kg/m²     FHT:  134 BPM    Uterine Size: 34 cm   Presentations: cephalic   Pelvic Exam:     Dilation: 2cm    Effacement: 50%    Station:  -2                 Assessment    There are no diagnoses linked to this encounter.    1) Pregnancy at 38w0d  2) Fetal status reassuring   3) GBS status - Positive - will treat in labor   4) Anemia,   Mild and stable on oral iron   5) SGA, noted     Plan    Labor warnings   [facility] [place]        To Larose MD   2022  13:53 EST

## 2022-01-14 ENCOUNTER — ANESTHESIA (OUTPATIENT)
Dept: LABOR AND DELIVERY | Facility: HOSPITAL | Age: 22
End: 2022-01-14

## 2022-01-14 ENCOUNTER — ANESTHESIA EVENT (OUTPATIENT)
Dept: LABOR AND DELIVERY | Facility: HOSPITAL | Age: 22
End: 2022-01-14

## 2022-01-14 ENCOUNTER — HOSPITAL ENCOUNTER (INPATIENT)
Facility: HOSPITAL | Age: 22
LOS: 2 days | Discharge: HOME | End: 2022-01-16
Attending: OBSTETRICS & GYNECOLOGY | Admitting: OBSTETRICS & GYNECOLOGY

## 2022-01-14 PROBLEM — Z34.90 PREGNANCY: Status: ACTIVE | Noted: 2022-01-14

## 2022-01-14 LAB
ABO GROUP BLD: NORMAL
ABO GROUP BLD: NORMAL
BACTERIA UR QL AUTO: ABNORMAL /HPF
BILIRUB UR QL STRIP: NEGATIVE
BLD GP AB SCN SERPL QL: NEGATIVE
CLARITY UR: ABNORMAL
COLOR UR: YELLOW
DEPRECATED RDW RBC AUTO: 45.8 FL (ref 37–54)
ERYTHROCYTE [DISTWIDTH] IN BLOOD BY AUTOMATED COUNT: 13.3 % (ref 12.3–15.4)
FETAL BLEED: NEGATIVE
GLUCOSE UR STRIP-MCNC: NEGATIVE MG/DL
HCT VFR BLD AUTO: 34 % (ref 34–46.6)
HGB BLD-MCNC: 11.8 G/DL (ref 12–15.9)
HGB UR QL STRIP.AUTO: ABNORMAL
HYALINE CASTS UR QL AUTO: ABNORMAL /LPF
KETONES UR QL STRIP: NEGATIVE
LEUKOCYTE ESTERASE UR QL STRIP.AUTO: ABNORMAL
MCH RBC QN AUTO: 32.9 PG (ref 26.6–33)
MCHC RBC AUTO-ENTMCNC: 34.7 G/DL (ref 31.5–35.7)
MCV RBC AUTO: 94.7 FL (ref 79–97)
NITRITE UR QL STRIP: NEGATIVE
NUMBER OF DOSES: NORMAL
PH UR STRIP.AUTO: 7 [PH] (ref 5–8)
PLATELET # BLD AUTO: 246 10*3/MM3 (ref 140–450)
PMV BLD AUTO: 11.5 FL (ref 6–12)
PROT UR QL STRIP: ABNORMAL
RBC # BLD AUTO: 3.59 10*6/MM3 (ref 3.77–5.28)
RBC # UR STRIP: ABNORMAL /HPF
REF LAB TEST METHOD: ABNORMAL
RH BLD: NEGATIVE
RH BLD: NEGATIVE
SARS-COV-2 RNA RESP QL NAA+PROBE: NOT DETECTED
SP GR UR STRIP: 1.02 (ref 1–1.03)
SQUAMOUS #/AREA URNS HPF: ABNORMAL /HPF
T&S EXPIRATION DATE: NORMAL
UROBILINOGEN UR QL STRIP: ABNORMAL
WBC # UR STRIP: ABNORMAL /HPF
WBC NRBC COR # BLD: 11.37 10*3/MM3 (ref 3.4–10.8)

## 2022-01-14 PROCEDURE — C1755 CATHETER, INTRASPINAL: HCPCS | Performed by: ANESTHESIOLOGY

## 2022-01-14 PROCEDURE — 81001 URINALYSIS AUTO W/SCOPE: CPT | Performed by: OBSTETRICS & GYNECOLOGY

## 2022-01-14 PROCEDURE — 25010000002 RHO D IMMUNE GLOBULIN 1500 UNIT/2ML SOLUTION PREFILLED SYRINGE: Performed by: OBSTETRICS & GYNECOLOGY

## 2022-01-14 PROCEDURE — 99202 OFFICE O/P NEW SF 15 MIN: CPT | Performed by: OBSTETRICS & GYNECOLOGY

## 2022-01-14 PROCEDURE — U0003 INFECTIOUS AGENT DETECTION BY NUCLEIC ACID (DNA OR RNA); SEVERE ACUTE RESPIRATORY SYNDROME CORONAVIRUS 2 (SARS-COV-2) (CORONAVIRUS DISEASE [COVID-19]), AMPLIFIED PROBE TECHNIQUE, MAKING USE OF HIGH THROUGHPUT TECHNOLOGIES AS DESCRIBED BY CMS-2020-01-R: HCPCS | Performed by: OBSTETRICS & GYNECOLOGY

## 2022-01-14 PROCEDURE — 86850 RBC ANTIBODY SCREEN: CPT | Performed by: OBSTETRICS & GYNECOLOGY

## 2022-01-14 PROCEDURE — 86901 BLOOD TYPING SEROLOGIC RH(D): CPT | Performed by: OBSTETRICS & GYNECOLOGY

## 2022-01-14 PROCEDURE — 0HQ9XZZ REPAIR PERINEUM SKIN, EXTERNAL APPROACH: ICD-10-PCS | Performed by: OBSTETRICS & GYNECOLOGY

## 2022-01-14 PROCEDURE — 85461 HEMOGLOBIN FETAL: CPT | Performed by: OBSTETRICS & GYNECOLOGY

## 2022-01-14 PROCEDURE — S0260 H&P FOR SURGERY: HCPCS | Performed by: OBSTETRICS & GYNECOLOGY

## 2022-01-14 PROCEDURE — 59410 OBSTETRICAL CARE: CPT | Performed by: OBSTETRICS & GYNECOLOGY

## 2022-01-14 PROCEDURE — C1755 CATHETER, INTRASPINAL: HCPCS

## 2022-01-14 PROCEDURE — 87086 URINE CULTURE/COLONY COUNT: CPT | Performed by: OBSTETRICS & GYNECOLOGY

## 2022-01-14 PROCEDURE — 86900 BLOOD TYPING SEROLOGIC ABO: CPT | Performed by: OBSTETRICS & GYNECOLOGY

## 2022-01-14 PROCEDURE — 25010000002 PENICILLIN G POTASSIUM PER 600000 UNITS: Performed by: OBSTETRICS & GYNECOLOGY

## 2022-01-14 PROCEDURE — 85027 COMPLETE CBC AUTOMATED: CPT | Performed by: OBSTETRICS & GYNECOLOGY

## 2022-01-14 RX ORDER — HYDROCORTISONE 25 MG/G
1 CREAM TOPICAL AS NEEDED
Status: DISCONTINUED | OUTPATIENT
Start: 2022-01-14 | End: 2022-01-16 | Stop reason: HOSPADM

## 2022-01-14 RX ORDER — DIPHENHYDRAMINE HYDROCHLORIDE 50 MG/ML
12.5 INJECTION INTRAMUSCULAR; INTRAVENOUS EVERY 8 HOURS PRN
Status: DISCONTINUED | OUTPATIENT
Start: 2022-01-14 | End: 2022-01-14 | Stop reason: HOSPADM

## 2022-01-14 RX ORDER — EPHEDRINE SULFATE 50 MG/ML
5 INJECTION, SOLUTION INTRAVENOUS AS NEEDED
Status: DISCONTINUED | OUTPATIENT
Start: 2022-01-14 | End: 2022-01-14 | Stop reason: HOSPADM

## 2022-01-14 RX ORDER — BISACODYL 10 MG
10 SUPPOSITORY, RECTAL RECTAL DAILY PRN
Status: DISCONTINUED | OUTPATIENT
Start: 2022-01-15 | End: 2022-01-16 | Stop reason: HOSPADM

## 2022-01-14 RX ORDER — PHYTONADIONE 1 MG/.5ML
INJECTION, EMULSION INTRAMUSCULAR; INTRAVENOUS; SUBCUTANEOUS
Status: ACTIVE
Start: 2022-01-14 | End: 2022-01-14

## 2022-01-14 RX ORDER — ERYTHROMYCIN 5 MG/G
OINTMENT OPHTHALMIC
Status: ACTIVE
Start: 2022-01-14 | End: 2022-01-14

## 2022-01-14 RX ORDER — PENICILLIN G 3000000 [IU]/50ML
3 INJECTION, SOLUTION INTRAVENOUS EVERY 4 HOURS
Status: DISCONTINUED | OUTPATIENT
Start: 2022-01-14 | End: 2022-01-14 | Stop reason: HOSPADM

## 2022-01-14 RX ORDER — OXYTOCIN-SODIUM CHLORIDE 0.9% IV SOLN 30 UNIT/500ML 30-0.9/5 UT/ML-%
999 SOLUTION INTRAVENOUS ONCE
Status: COMPLETED | OUTPATIENT
Start: 2022-01-14 | End: 2022-01-14

## 2022-01-14 RX ORDER — ONDANSETRON 2 MG/ML
4 INJECTION INTRAMUSCULAR; INTRAVENOUS ONCE AS NEEDED
Status: DISCONTINUED | OUTPATIENT
Start: 2022-01-14 | End: 2022-01-14 | Stop reason: HOSPADM

## 2022-01-14 RX ORDER — MAGNESIUM CARB/ALUMINUM HYDROX 105-160MG
30 TABLET,CHEWABLE ORAL ONCE
Status: DISCONTINUED | OUTPATIENT
Start: 2022-01-14 | End: 2022-01-14 | Stop reason: HOSPADM

## 2022-01-14 RX ORDER — PRENATAL VIT/IRON FUM/FOLIC AC 27MG-0.8MG
1 TABLET ORAL DAILY
Status: DISCONTINUED | OUTPATIENT
Start: 2022-01-14 | End: 2022-01-16 | Stop reason: HOSPADM

## 2022-01-14 RX ORDER — FAMOTIDINE 20 MG/1
20 TABLET, FILM COATED ORAL EVERY 12 HOURS SCHEDULED
Status: DISCONTINUED | OUTPATIENT
Start: 2022-01-14 | End: 2022-01-14 | Stop reason: HOSPADM

## 2022-01-14 RX ORDER — IBUPROFEN 800 MG/1
800 TABLET ORAL EVERY 8 HOURS PRN
Status: DISCONTINUED | OUTPATIENT
Start: 2022-01-14 | End: 2022-01-16 | Stop reason: HOSPADM

## 2022-01-14 RX ORDER — HYDROCODONE BITARTRATE AND ACETAMINOPHEN 5; 325 MG/1; MG/1
1 TABLET ORAL EVERY 4 HOURS PRN
Status: DISCONTINUED | OUTPATIENT
Start: 2022-01-14 | End: 2022-01-16 | Stop reason: HOSPADM

## 2022-01-14 RX ORDER — TERBUTALINE SULFATE 1 MG/ML
0.25 INJECTION, SOLUTION SUBCUTANEOUS AS NEEDED
Status: DISCONTINUED | OUTPATIENT
Start: 2022-01-14 | End: 2022-01-14 | Stop reason: HOSPADM

## 2022-01-14 RX ORDER — CARBOPROST TROMETHAMINE 250 UG/ML
250 INJECTION, SOLUTION INTRAMUSCULAR
Status: DISCONTINUED | OUTPATIENT
Start: 2022-01-14 | End: 2022-01-14 | Stop reason: HOSPADM

## 2022-01-14 RX ORDER — METHYLERGONOVINE MALEATE 0.2 MG/ML
200 INJECTION INTRAVENOUS ONCE AS NEEDED
Status: DISCONTINUED | OUTPATIENT
Start: 2022-01-14 | End: 2022-01-14 | Stop reason: HOSPADM

## 2022-01-14 RX ORDER — FAMOTIDINE 10 MG/ML
20 INJECTION, SOLUTION INTRAVENOUS ONCE AS NEEDED
Status: DISCONTINUED | OUTPATIENT
Start: 2022-01-14 | End: 2022-01-14 | Stop reason: HOSPADM

## 2022-01-14 RX ORDER — ONDANSETRON 2 MG/ML
4 INJECTION INTRAMUSCULAR; INTRAVENOUS EVERY 6 HOURS PRN
Status: DISCONTINUED | OUTPATIENT
Start: 2022-01-14 | End: 2022-01-14 | Stop reason: HOSPADM

## 2022-01-14 RX ORDER — DIPHENHYDRAMINE HCL 25 MG
25 CAPSULE ORAL NIGHTLY PRN
Status: DISCONTINUED | OUTPATIENT
Start: 2022-01-14 | End: 2022-01-16 | Stop reason: HOSPADM

## 2022-01-14 RX ORDER — MISOPROSTOL 200 UG/1
800 TABLET ORAL ONCE AS NEEDED
Status: DISCONTINUED | OUTPATIENT
Start: 2022-01-14 | End: 2022-01-14 | Stop reason: HOSPADM

## 2022-01-14 RX ORDER — OXYTOCIN-SODIUM CHLORIDE 0.9% IV SOLN 30 UNIT/500ML 30-0.9/5 UT/ML-%
250 SOLUTION INTRAVENOUS CONTINUOUS PRN
Status: ACTIVE | OUTPATIENT
Start: 2022-01-14 | End: 2022-01-14

## 2022-01-14 RX ORDER — SODIUM CHLORIDE, SODIUM LACTATE, POTASSIUM CHLORIDE, CALCIUM CHLORIDE 600; 310; 30; 20 MG/100ML; MG/100ML; MG/100ML; MG/100ML
125 INJECTION, SOLUTION INTRAVENOUS CONTINUOUS
Status: DISCONTINUED | OUTPATIENT
Start: 2022-01-14 | End: 2022-01-16 | Stop reason: HOSPADM

## 2022-01-14 RX ORDER — FERROUS SULFATE 325(65) MG
325 TABLET ORAL 2 TIMES DAILY WITH MEALS
Status: DISCONTINUED | OUTPATIENT
Start: 2022-01-14 | End: 2022-01-16 | Stop reason: HOSPADM

## 2022-01-14 RX ORDER — OXYTOCIN-SODIUM CHLORIDE 0.9% IV SOLN 30 UNIT/500ML 30-0.9/5 UT/ML-%
125 SOLUTION INTRAVENOUS CONTINUOUS PRN
Status: COMPLETED | OUTPATIENT
Start: 2022-01-14 | End: 2022-01-14

## 2022-01-14 RX ORDER — ONDANSETRON 4 MG/1
4 TABLET, FILM COATED ORAL EVERY 8 HOURS PRN
Status: DISCONTINUED | OUTPATIENT
Start: 2022-01-14 | End: 2022-01-16 | Stop reason: HOSPADM

## 2022-01-14 RX ORDER — SODIUM CHLORIDE 0.9 % (FLUSH) 0.9 %
10 SYRINGE (ML) INJECTION AS NEEDED
Status: DISCONTINUED | OUTPATIENT
Start: 2022-01-14 | End: 2022-01-14 | Stop reason: HOSPADM

## 2022-01-14 RX ORDER — DOCUSATE SODIUM 100 MG/1
100 CAPSULE, LIQUID FILLED ORAL 2 TIMES DAILY
Status: DISCONTINUED | OUTPATIENT
Start: 2022-01-14 | End: 2022-01-16 | Stop reason: HOSPADM

## 2022-01-14 RX ORDER — SODIUM CHLORIDE 0.9 % (FLUSH) 0.9 %
10 SYRINGE (ML) INJECTION EVERY 12 HOURS SCHEDULED
Status: DISCONTINUED | OUTPATIENT
Start: 2022-01-14 | End: 2022-01-14 | Stop reason: HOSPADM

## 2022-01-14 RX ORDER — ONDANSETRON 4 MG/1
4 TABLET, FILM COATED ORAL EVERY 6 HOURS PRN
Status: DISCONTINUED | OUTPATIENT
Start: 2022-01-14 | End: 2022-01-14 | Stop reason: HOSPADM

## 2022-01-14 RX ORDER — LIDOCAINE HYDROCHLORIDE 10 MG/ML
5 INJECTION, SOLUTION EPIDURAL; INFILTRATION; INTRACAUDAL; PERINEURAL AS NEEDED
Status: DISCONTINUED | OUTPATIENT
Start: 2022-01-14 | End: 2022-01-14 | Stop reason: HOSPADM

## 2022-01-14 RX ORDER — FAMOTIDINE 10 MG/ML
20 INJECTION, SOLUTION INTRAVENOUS EVERY 12 HOURS SCHEDULED
Status: DISCONTINUED | OUTPATIENT
Start: 2022-01-14 | End: 2022-01-14 | Stop reason: HOSPADM

## 2022-01-14 RX ORDER — SODIUM CHLORIDE 0.9 % (FLUSH) 0.9 %
1-10 SYRINGE (ML) INJECTION AS NEEDED
Status: DISCONTINUED | OUTPATIENT
Start: 2022-01-14 | End: 2022-01-16 | Stop reason: HOSPADM

## 2022-01-14 RX ADMIN — SODIUM CHLORIDE, POTASSIUM CHLORIDE, SODIUM LACTATE AND CALCIUM CHLORIDE 125 ML/HR: 600; 310; 30; 20 INJECTION, SOLUTION INTRAVENOUS at 05:44

## 2022-01-14 RX ADMIN — EPHEDRINE SULFATE 5 MG: 50 INJECTION INTRAVENOUS at 05:47

## 2022-01-14 RX ADMIN — IBUPROFEN 800 MG: 800 TABLET, FILM COATED ORAL at 10:23

## 2022-01-14 RX ADMIN — PENICILLIN G 3 MILLION UNITS: 3000000 INJECTION, SOLUTION INTRAVENOUS at 08:05

## 2022-01-14 RX ADMIN — OXYTOCIN 999 ML/HR: 10 INJECTION INTRAVENOUS at 09:08

## 2022-01-14 RX ADMIN — Medication 10 ML/HR: at 05:14

## 2022-01-14 RX ADMIN — SODIUM CHLORIDE, POTASSIUM CHLORIDE, SODIUM LACTATE AND CALCIUM CHLORIDE 1000 ML: 600; 310; 30; 20 INJECTION, SOLUTION INTRAVENOUS at 04:31

## 2022-01-14 RX ADMIN — FERROUS SULFATE TAB 325 MG (65 MG ELEMENTAL FE) 325 MG: 325 (65 FE) TAB at 17:15

## 2022-01-14 RX ADMIN — SODIUM CHLORIDE, POTASSIUM CHLORIDE, SODIUM LACTATE AND CALCIUM CHLORIDE 125 ML/HR: 600; 310; 30; 20 INJECTION, SOLUTION INTRAVENOUS at 03:55

## 2022-01-14 RX ADMIN — Medication 1 TABLET: at 17:15

## 2022-01-14 RX ADMIN — EPHEDRINE SULFATE 5 MG: 50 INJECTION INTRAVENOUS at 05:50

## 2022-01-14 RX ADMIN — IBUPROFEN 800 MG: 800 TABLET, FILM COATED ORAL at 20:11

## 2022-01-14 RX ADMIN — PENICILLIN G POTASSIUM 5 MILLION UNITS: 5000000 INJECTION, POWDER, FOR SOLUTION INTRAMUSCULAR; INTRAVENOUS at 04:26

## 2022-01-14 RX ADMIN — HUMAN RHO(D) IMMUNE GLOBULIN 1500 UNITS: 1500 SOLUTION INTRAMUSCULAR; INTRAVENOUS at 16:38

## 2022-01-14 RX ADMIN — OXYTOCIN 125 ML/HR: 10 INJECTION INTRAVENOUS at 10:53

## 2022-01-14 RX ADMIN — DOCUSATE SODIUM 100 MG: 100 CAPSULE, LIQUID FILLED ORAL at 20:11

## 2022-01-14 NOTE — H&P
[facility]  Obstetric History and Physical    Chief Complaint   Patient presents with   • Rupture of Membranes       Subjective     Patient is a 21 y.o. female  currently at 38w2d, who presents with rupture of membranes..    Her prenatal care is benign.  Her previous obstetric/gynecological history is noted for is non-contributory.    The following portions of the patients history were reviewed and updated as appropriate: current medications, allergies, past medical history, past surgical history, past family history, past social history and problem list .       Prenatal Information:  Prenatal Results     POC Urine Glucose/Protein     Test Value Reference Range Date Time    Urine Glucose ^ Negative  Negative, 1000 mg/dL (3+) 22     Urine Protein ^ 1+  Negative 22           Initial Prenatal Labs     Test Value Reference Range Date Time    Hemoglobin  11.8 g/dL 12.0 - 15.9 22 0350       10.9 g/dL 11.1 - 15.9 10/27/21 1251       12.3 g/dL 11.1 - 15.9 21 1525      ^ 11.9 g/dL 12.0 - 16.0 21 1200       12.9 g/dL 12.0 - 15.9 21 2336    Hematocrit  34.0 % 34.0 - 46.6 22 0350       31.5 % 34.0 - 46.6 10/27/21 1251       37.3 % 34.0 - 46.6 21 1525      ^ 35.5 % 36.0 - 46.0 21 1200       38.8 % 34.0 - 46.6 21 2336    Platelets  246 10*3/mm3 140 - 450 22 0350       234 x10E3/uL 150 - 450 10/27/21 1251       267 x10E3/uL 150 - 450 21 1525      ^ 183 10*3/uL 140 - 440 21 1200       174 10*3/mm3 140 - 450 21 2336    Rubella IgG  2.28 index Immune >0.99 21 1525    Hepatitis B SAg  Negative  Negative 21 1525    Hepatitis C Ab  <0.1 s/co ratio 0.0 - 0.9 21 1525    RPR  Non Reactive  Non Reactive 21 1525    ABO  O   22 0350    Rh  Negative   22 0350    Antibody Screen  Negative   22 0350       Negative  Negative 21 1525    HIV  Non Reactive  Non Reactive 21 1525    Urine Culture  Final  report   06/21/21 0349    Gonorrhea  Negative  Negative 06/21/21 0351    Chlamydia  Negative  Negative 06/21/21 0351    TSH        HgB A1c               2nd and 3rd Trimester     Test Value Reference Range Date Time    Hemoglobin (repeated)  11.8 g/dL 12.0 - 15.9 01/14/22 0350       10.9 g/dL 11.1 - 15.9 10/27/21 1251    Hematocrit (repeated)  34.0 % 34.0 - 46.6 01/14/22 0350       31.5 % 34.0 - 46.6 10/27/21 1251    Platelets   246 10*3/mm3 140 - 450 01/14/22 0350       234 x10E3/uL 150 - 450 10/27/21 1251       267 x10E3/uL 150 - 450 06/21/21 1525      ^ 183 10*3/uL 140 - 440 06/14/21 1200       174 10*3/mm3 140 - 450 06/13/21 2336    GCT  69 mg/dL 65 - 139 10/27/21 1251    Antibody Screen (repeated)  Negative   01/14/22 0350    GTT Fasting        GTT 1 Hr        GTT 2 Hr        GTT 3 Hr        Group B Strep  Positive  Negative 01/03/22 0206          Drug Screening     Test Value Reference Range Date Time    Amphetamine Screen        Barbiturate Screen        Benzodiazepine Screen        Methadone Screen        Phencyclidine Screen        Opiates Screen        THC Screen        Cocaine Screen        Propoxyphene Screen        Buprenorphine Screen        Methamphetamine Screen        Oxycodone Screen        Tricyclic Antidepressants Screen              Other (Risk screening)     Test Value Reference Range Date Time    Varicella IgG        Parvovirus IgG        CMV IgG        Cystic Fibrosis        Hemoglobin electrophoresis        NIPT        MSAFP-4        AFP (for NTD only)              Legend    ^: Historical                      External Prenatal Results     Pregnancy Outside Results - Transcribed From Office Records - See Scanned Records For Details     Test Value Date Time    ABO  O  01/14/22 0350    Rh  Negative  01/14/22 0350    Antibody Screen  Negative  01/14/22 0350       Negative  06/21/21 1525    Varicella IgG       Rubella  2.28 index 06/21/21 1525    Hgb  11.8 g/dL 01/14/22 0350       10.9 g/dL  10/27/21 1251       12.3 g/dL 21 1525      ^ 11.9 g/dL 21 1200       12.9 g/dL 21 2336    Hct  34.0 % 22 0350       31.5 % 10/27/21 1251       37.3 % 21 1525      ^ 35.5 % 21 1200       38.8 % 21 2336    Glucose Fasting GTT       Glucose Tolerance Test 1 hour       Glucose Tolerance Test 3 hour       Gonorrhea (discrete)  Negative  21 0351    Chlamydia (discrete)  Negative  21 0351    RPR  Non Reactive  21 1525    VDRL       Syphilis Antibody       HBsAg  Negative  21 1525    Herpes Simplex Virus PCR       Herpes Simplex VIrus Culture       HIV  Non Reactive  21 1525    Hep C RNA Quant PCR       Hep C Antibody  <0.1 s/co ratio 21 1525    AFP       Group B Strep  Positive  22 0206    GBS Susceptibility to Clindamycin       GBS Susceptibility to Erythromycin       Fetal Fibronectin       Genetic Testing, Maternal Blood             Drug Screening     Test Value Date Time    Urine Drug Screen       Amphetamine Screen       Barbiturate Screen       Benzodiazepine Screen       Methadone Screen       Phencyclidine Screen       Opiates Screen       THC Screen       Cocaine Screen       Propoxyphene Screen       Buprenorphine Screen       Methamphetamine Screen       Oxycodone Screen       Tricyclic Antidepressants Screen             Legend    ^: Historical                         Past OB History:     OB History    Para Term  AB Living   2 1 1 0 0 1   SAB IAB Ectopic Molar Multiple Live Births   0 0 0 0 0 1      # Outcome Date GA Lbr Tom/2nd Weight Sex Delivery Anes PTL Lv   2 Current            1 Term 21 39w5d 10:49 / 01:18 2970 g (6 lb 8.8 oz) F Vag-Spont EPI N ZAIDA      Birth Comments: scale 3      Name: OSMANY GALLO      Apgar1: 8  Apgar5: 9       Past Medical History: Past Medical History:   Diagnosis Date   • Chlamydia       Past Surgical History Past Surgical History:   Procedure Laterality Date   • EAR TUBES      • WISDOM TOOTH EXTRACTION        Family History: Family History   Problem Relation Age of Onset   • Colon cancer Maternal Grandfather    • Deep vein thrombosis Maternal Great-Grandmother    • Breast cancer Neg Hx    • Ovarian cancer Neg Hx    • Uterine cancer Neg Hx    • Pulmonary embolism Neg Hx       Social History:  reports that she has never smoked. She has never used smokeless tobacco.   reports no history of alcohol use.   reports no history of drug use.        General ROS: Pertinent items are noted in HPI    Objective       Vital Signs Range for the last 24 hours  Temperature: Temp:  [97.2 °F (36.2 °C)-98.7 °F (37.1 °C)] 97.5 °F (36.4 °C)   Temp Source: Temp src: Oral   BP: BP: ()/(43-82) 111/49   Pulse: Heart Rate:  [57-99] 58   Respirations: Resp:  [16-20] 18   SPO2: SpO2:  [97 %-100 %] 100 %   O2 Amount (l/min):     O2 Devices     Weight: Weight:  [86.2 kg (190 lb)-89.8 kg (198 lb)] 86.2 kg (190 lb)     Physical Examination: General appearance - alert, well appearing, and in no distress and oriented to person, place, and time  Mental status - alert, oriented to person, place, and time, normal mood, behavior, speech, dress, motor activity, and thought processes  Neck - supple, no significant adenopathy  Chest - clear to auscultation, no wheezes, rales or rhonchi, symmetric air entry  Heart - normal rate, regular rhythm, normal S1, S2, no murmurs, rubs, clicks or gallops  Abdomen - soft, nontender, nondistended, no masses or organomegaly  Gravid at 38 week size.  Neurological - alert, oriented, normal speech, no focal findings or movement disorder noted  Extremities - peripheral pulses normal, no pedal edema, no clubbing or cyanosis    Presentation: cephalic   Cervix: Exam by: Method: sterile exam per RN   Dilation: Cervical Dilation (cm): 10   Effacement: Cervical Effacement: 100%   Station:         Fetal Heart Rate Assessment   Method: Fetal HR Assessment Method: external   Beats/min: Fetal HR  (beats/min): 110   Baseline: Fetal Heart Baseline Rate: normal range   Variability: Fetal HR Variability: moderate (amplitude range 6 to 25 bpm)   Accels: Fetal HR Accelerations: greater than/equal to 15 bpm, lasting at least 15 seconds   Decels: Fetal HR Decelerations: late   Tracing Category: Fetal HR Tracing Category: Category I     Uterine Assessment   Method: Method: palpation, external tocotransducer   Frequency (min): Contraction Frequency (Minutes): 2-5   Ctx Count in 10 min: Contractions in 10 Minutes: 4   Duration:     Intensity: Contraction Intensity: moderate by palpation   Intensity by IUPC:     Resting Tone: Uterine Resting Tone: soft by palpation   Resting Tone by IUPC:     Independence Units:       Laboratory Results: Hgb.  11.8, Covid negative.  Radiology Review: .  Other Studies: .    Assessment/Plan       Pregnancy        Assessment:  1.  Intrauterine pregnancy at 38w2d gestation with reactive, reassuring fetal status.    2.  labor  with ROM  3.  Obstetrical history significant for is non-contributory.  4.  GBS status:   Strep Gp B MARVIN   Date Value Ref Range Status   2022 Positive (A) Negative Final     Comment:     Centers for Disease Control and Prevention (CDC) and American Congress  of Obstetricians and Gynecologists (ACOG) guidelines for prevention of   group B streptococcal (GBS) disease specify co-collection of  a vaginal and rectal swab specimen to maximize sensitivity of GBS  detection. Per the CDC and ACOG, swabbing both the lower vagina and  rectum substantially increases the yield of detection compared with  sampling the vagina alone.  Penicillin G, ampicillin, or cefazolin are indicated for intrapartum  prophylaxis of  GBS colonization. Reflex susceptibility  testing should be performed prior to use of clindamycin only on GBS  isolates from penicillin-allergic women who are considered a high risk  for anaphylaxis. Treatment with vancomycin without additional  testing  is warranted if resistance to clindamycin is noted.         Plan:  1. Vaginal anticipated, fetal and uterine monitoring  continuously, labor augmentation  Pitocin and analgesia with  epidural  2. Plan of care has been reviewed with patient and her partner  3.  Risks, benefits of treatment plan have been discussed.  4.  All questions have been answered.  5.  .      Christiano Trevizo MD  1/14/2022  11:44 EST

## 2022-01-14 NOTE — ANESTHESIA POSTPROCEDURE EVALUATION
"Patient: Elise Crockett    Procedure Summary     Date: 01/14/22 Room / Location:     Anesthesia Start: 0445 Anesthesia Stop: 0904    Procedure: LABOR ANALGESIA Diagnosis:     Scheduled Providers:  Provider: Christiano Moreno MD    Anesthesia Type: epidural ASA Status: 2          Anesthesia Type: epidural    Vitals  Vitals Value Taken Time   /63 01/14/22 0915   Temp 36.4 °C (97.5 °F) 01/14/22 0913   Pulse 77 01/14/22 0915   Resp 16 01/14/22 0715   SpO2 100 % 01/14/22 0900           Post Anesthesia Care and Evaluation    Patient participation: complete - patient cannot participate  Anesthetic complications: No anesthetic complications    Cardiovascular status: acceptable  Respiratory status: acceptable  Hydration status: acceptable    Comments: Patient was discharged prior to being evaluated by Anesthesia...per chart review, no anesthetic complications were noted.  NOT MEDICALLY DIRECTED  /63   Pulse 77   Temp 36.4 °C (97.5 °F) (Oral)   Resp 16   Ht 165.1 cm (65\")   Wt 86.2 kg (190 lb)   LMP 04/15/2021 (Approximate)   SpO2 100%   Breastfeeding No   BMI 31.62 kg/m²         "

## 2022-01-14 NOTE — OBED NOTES
[facility] Note OB        Patient Name: Elise Gallo  YOB: 2000  MRN: [id number]  Admission Date: 2022  2:29 AM  Date of Service: 2022    Chief Complaint: Rupture of Membranes        Subjective     Elise Gallo is a 21 y.o. female  at 38w2d with Estimated Date of Delivery: 22 who presents with the chief complaint listed above.  She sees To Larose MD for her prenatal care. Her pregnancy has been uncomplicated.  She presents this evening secondary to rupture of membranes at 01:40 AM she noted a large gush of fluid and is continued to leak since that time.  She denies any vaginal bleeding.  She is feeling normal fetal movement.  She is feeling contractions.  The patient denies fever, cough, cold or flu symptoms, shortness of breath, or changes in taste or smell.              Objective   Patient Active Problem List    Diagnosis    • Pregnancy [Z34.90]    • GBS (group B Streptococcus carrier), +RV culture, currently pregnant [O99.820]    • Normal vaginal delivery [O80]    • Supervision of normal pregnancy in third trimester [Z34.93]    • GBS (group B Streptococcus carrier), +RV culture, currently pregnant [O99.820]         OB History    Para Term  AB Living   2 1 1 0 0 1   SAB IAB Ectopic Molar Multiple Live Births   0 0 0 0 0 1      # Outcome Date GA Lbr Tom/2nd Weight Sex Delivery Anes PTL Lv   2 Current            1 Term 20 39w5d 10:49 / 01:18 2970 g (6 lb 8.8 oz) F Vag-Spont EPI N ZAIDA      Birth Comments: scale 3      Name: OSMANY GALLO      Apgar1: 8  Apgar5: 9        Past Medical History:   Diagnosis Date   • Chlamydia        Past Surgical History:   Procedure Laterality Date   • EAR TUBES     • WISDOM TOOTH EXTRACTION         No current facility-administered medications on file prior to encounter.     Current Outpatient Medications on File Prior to Encounter   Medication Sig Dispense Refill   • ferrous sulfate 325 (65 FE) MG tablet Take 1  "tablet by mouth Daily With Breakfast. 30 tablet 6   • Prenatal 28-0.8 MG tablet Take 1 tablet by mouth Daily. Please use formulary or generic, with DHA ideal 90 each 3       No Known Allergies    Family History   Problem Relation Age of Onset   • Colon cancer Maternal Grandfather    • Deep vein thrombosis Maternal Great-Grandmother    • Breast cancer Neg Hx    • Ovarian cancer Neg Hx    • Uterine cancer Neg Hx    • Pulmonary embolism Neg Hx        Social History     Socioeconomic History   • Marital status: Single   Tobacco Use   • Smoking status: Never Smoker   • Smokeless tobacco: Never Used   Substance and Sexual Activity   • Alcohol use: No   • Drug use: No   • Sexual activity: Yes     Partners: Male           Review of Systems   Constitutional: Negative for chills, fatigue and fever.   HENT: Negative.    Eyes: Negative for photophobia and visual disturbance.   Respiratory: Negative for cough, chest tightness and shortness of breath.    Cardiovascular: Negative for chest pain and leg swelling.   Gastrointestinal: Positive for abdominal pain ( Intermittent abdominal pain consistent with contractions). Negative for diarrhea, nausea and vomiting.   Genitourinary: Positive for vaginal discharge ( Leakage of clear fluid since 1:40 AM this morning). Negative for dysuria, flank pain, hematuria, pelvic pain and vaginal bleeding.   Musculoskeletal: Negative for back pain.   Neurological: Negative for dizziness, seizures, weakness and headaches.          PHYSICAL EXAM:      VITAL SIGNS:  Vitals:    01/14/22 0231 01/14/22 0305 01/14/22 0309   BP:   112/70   Pulse:  78 75   Resp:  18 18   Temp:  97.7 °F (36.5 °C) 97.7 °F (36.5 °C)   TempSrc:  Oral Oral   SpO2:  97% 97%   Weight: 89.8 kg (198 lb) 86.2 kg (190 lb)    Height:  165.1 cm (65\")         FHT'S:                   Baseline:  125 BPM  Variability:  Moderate = 6 - 25 BPM  Accelerations:  15 x 15 accelerations present     Decelerations:  absent  Contractions:   present "     Interpretation:   Reactive NST, CAT 1 tracing        PHYSICAL EXAM:    General: well developed; well nourished  no acute distress   Heart: Not performed.   Lungs: breathing is unlabored     Abdomen: soft, non-tender; no masses  no umbilical or inguinal hernias are present       Cervix: was examined by nurse, pooling present, nitrazine positive  Cervical Dilation (cm): 3  Cervical Effacement: 50-60%  Cervical Consistency: soft  Cervical Position: posterior   Contractions: irregular        Extremities: peripheral pulses normal, no pedal edema, no clubbing or cyanosis      LABS AND TESTING ORDERED:  1. Uterine and fetal monitoring  2. Urinalysis  Nitrazine testing    LAB RESULTS:    No results found for this or any previous visit (from the past 24 hour(s)).    Lab Results   Component Value Date    ABO O 2021    RH Negative 2021       Lab Results   Component Value Date    STREPGPB Positive (A) 2022                 Assessment/Plan     ASSESSMENT/PLAN:  Elise Crockett is a 21 y.o. female  at 38w2d who presented with possible rupture of membranes.   She was evaluated for ROM and was found to have a Positive Nitrazine and Pooling present and her cervix was noted to be Cervical Dilation (cm): 3 cm/ Cervical Effacement: 50-60% % effaced/ vertex at   station on last exam.  She was noted to have a Reactive NST.  CAT 1 tracing.  In view of these findings she will be admitted for delivery by Dr Thuan SHAY, who will assume care and place orders at this time.      Final Impression:  • Pregnancy at 38w2d    • Reactive NST.  CAT 1 tracing  • Confirmed ROM  with Positive Nitrazine and Pooling present  • Contractions: irregular  Lab Results   Component Value Date    ABO O 2021    RH Negative 2021     Lab Results   Component Value Date    STREPGPB Positive (A) 2022   Prophylactic antibiotic therapy indicated for GBS  • COVID - 19 status pending        PLAN:  • Patient will be admitted  to Dr. To Larose MD due to confirmed rupture of membranes and Dr. To Larose MD will assume care of the patient at this time and provide further orders and management      I have spent 35 minutes including face to face time with the patient, greater than 50% in discussion of the diagnosis (counseling) and/or coordination of care.     Severino Davidson MD  1/14/2022  03:40 EST  OB Hospitalist  Phone:    [phone]

## 2022-01-14 NOTE — ANESTHESIA PREPROCEDURE EVALUATION
Anesthesia Evaluation     Patient summary reviewed and Nursing notes reviewed   no history of anesthetic complications:  NPO Solid Status: > 8 hours  NPO Liquid Status: > 2 hours           Airway   Mallampati: II  TM distance: >3 FB  Neck ROM: full  no difficulty expected  Dental - normal exam     Pulmonary - negative pulmonary ROS and normal exam   (-) COPD, asthma, not a smoker, lung cancer    ROS comment: covid status unknown at time of epidural placement, results pending  Cardiovascular - negative cardio ROS and normal exam  Exercise tolerance: good (4-7 METS)    Rhythm: regular  Rate: normal    (-) past MI, CAD, dysrhythmias, angina, CHF, cardiac stents, CABG, pericardial effusion      Neuro/Psych- negative ROS  (-) seizures, TIA, CVA  GI/Hepatic/Renal/Endo - negative ROS   (-) hiatal hernia, GERD, PUD, hepatitis, liver disease, no renal disease, diabetes, GI bleed, no thyroid disorder    Musculoskeletal (-) negative ROS    Abdominal  - normal exam   Substance History - negative use     OB/GYN    (+) Pregnant,     Comment: 38wk IUP      Other   blood dyscrasia anemia,                     Anesthesia Plan    ASA 2     epidural       Anesthetic plan, all risks, benefits, and alternatives have been provided, discussed and informed consent has been obtained with: patient and spouse/significant other.

## 2022-01-14 NOTE — L&D DELIVERY NOTE
[facility]  Vaginal Delivery Note   Review the Delivery Report for details.       Delivery     Delivery: Vaginal, Spontaneous     YOB: 2021    Time of Birth:  Gestational Age 9:04 AM   38w2d     Anesthesia: Epidural     Delivering clinician: Christiano Trevizo    Forceps?   No   Vacuum? No    Shoulder dystocia present: No        Delivery narrative: I was called to the room with patient complete and starting to push.  She pushes with 3 contractions and fetal heart tone is stable.  She crowns and delivery occurs over an intact perineum.  There is no nuchal cord and anterior shoulders delivered with easy gentle downward traction.  Baby is placed on maternal abdomen and 30 seconds transpires prior to cord clamping.  Blood is obtained and placenta delivered about 5 minutes after delivery of the baby with gentle traction and uterine massage.  Good uterine tone noted following delivery.  There are some small vaginal first-degree lacerations in the upper portion and then 1 at the perineum that were repaired with 3-0 Vicryl Rapide.  Good hemostasis resulted.  Counts were all correct.    Infant    Findings: male  infant     Infant observations: Weight: No birth weight on file.   Length:   in  Observations/Comments:  scale 4      Apgars: 8  @ 1 minute /    9  @ 5 minutes   Infant Name: .     Placenta, Cord, and Fluid    Placenta delivered  Spontaneous  at   1/14/2022  9:08 AM     Cord: 3 vessels  present.   Nuchal Cord?  no   Cord blood obtained: Yes    Cord gases obtained:  No    Cord gas results: Venous:  No results found for: PHCVEN    Arterial:  No results found for: PHCART     Repair    Episiotomy: None     No    Lacerations: Yes  Laceration Information  Laceration Repaired?   Perineal: 1st  Yes    Periurethral:       Labial:       Sulcus:       Vaginal: Yes  Yes    Cervical:         Suture used for repair: 3-0 Vicryl Rapide   Estimated Blood Loss:    200 cc           Quantitative Blood Loss: Quantitative  Blood Loss (mL): 144 mL (01/14/22 0916)                Complications  none    Disposition  Mother to Mother Baby/Postpartum  in stable condition currently.  Baby to remains with mom  in stable condition currently.      Christiano Trevizo MD  01/14/22  09:20 EST

## 2022-01-14 NOTE — ANESTHESIA PROCEDURE NOTES
Labor Epidural    Pre-sedation assessment completed: 1/14/2022 5:00 AM    Patient reassessed immediately prior to procedure    Patient location during procedure: OB  Start Time: 1/14/2022 5:00 AM  Stop Time: 1/14/2022 5:04 AM  Performed By  Anesthesiologist: Christiano Moreno MD  Preanesthetic Checklist  Completed: patient identified, IV checked, site marked, risks and benefits discussed, surgical consent, monitors and equipment checked, pre-op evaluation and timeout performed  Additional Notes  Labor epidural using Arrow kit, no heme/CSF aspirated, no paraesthesias.  Test dose with 3cc 1.5% lido with epi is negative.    Prep:  Pt Position:sitting  Sterile Tech:cap, gloves, mask and sterile barrier  Prep:chlorhexidine gluconate and isopropyl alcohol  Monitoring:blood pressure monitoring, continuous pulse oximetry and EKG  Epidural Block Procedure:  Approach:midline  Guidance:landmark technique and palpation technique  Location:L3-L4  Needle Type:Tuohy  Needle Gauge:17  Loss of Resistance Medium: air  Loss of Resistance: 7cm  Cath Depth at skin:12 cm  Paresthesia: none  Aspiration:negative  Test Dose:negative  Med administered at 1/14/2022 5:09 AM  Number of Attempts: 1  Post Assessment:  Dressing:occlusive dressing applied and secured with tape  Pt Tolerance:patient tolerated the procedure well with no apparent complications  Complications:no

## 2022-01-14 NOTE — LACTATION NOTE
Patient states her intention is to  Formula feed only . Patient is aware that she can change her mind if desired and or both breast and bottle feed if she so desires. LC # given.

## 2022-01-14 NOTE — PLAN OF CARE
Goal Outcome Evaluation:  Plan of Care Reviewed With: patient, significant other        Progress: improving  Outcome Summary: Vaginal delivery. VSS. 1st degree laceration repaired. Scant bleeding noted. Ibuprofen given for pain 4/10. Plans to formula feed. Transfer to mother baby.

## 2022-01-14 NOTE — PLAN OF CARE
Goal Outcome Evaluation:  Plan of Care Reviewed With: patient, significant other        Progress: no change  Outcome Summary: Active Labor.  Wants to deliver healthy baby boy vaginally

## 2022-01-15 LAB
BACTERIA SPEC AEROBE CULT: NORMAL
BASOPHILS # BLD AUTO: 0.06 10*3/MM3 (ref 0–0.2)
BASOPHILS NFR BLD AUTO: 0.5 % (ref 0–1.5)
DEPRECATED RDW RBC AUTO: 46 FL (ref 37–54)
EOSINOPHIL # BLD AUTO: 0.14 10*3/MM3 (ref 0–0.4)
EOSINOPHIL NFR BLD AUTO: 1.2 % (ref 0.3–6.2)
ERYTHROCYTE [DISTWIDTH] IN BLOOD BY AUTOMATED COUNT: 13.1 % (ref 12.3–15.4)
HCT VFR BLD AUTO: 33.3 % (ref 34–46.6)
HGB BLD-MCNC: 11.1 G/DL (ref 12–15.9)
IMM GRANULOCYTES # BLD AUTO: 0.04 10*3/MM3 (ref 0–0.05)
IMM GRANULOCYTES NFR BLD AUTO: 0.3 % (ref 0–0.5)
LYMPHOCYTES # BLD AUTO: 2.54 10*3/MM3 (ref 0.7–3.1)
LYMPHOCYTES NFR BLD AUTO: 21.4 % (ref 19.6–45.3)
MCH RBC QN AUTO: 32 PG (ref 26.6–33)
MCHC RBC AUTO-ENTMCNC: 33.3 G/DL (ref 31.5–35.7)
MCV RBC AUTO: 96 FL (ref 79–97)
MONOCYTES # BLD AUTO: 0.55 10*3/MM3 (ref 0.1–0.9)
MONOCYTES NFR BLD AUTO: 4.6 % (ref 5–12)
NEUTROPHILS NFR BLD AUTO: 72 % (ref 42.7–76)
NEUTROPHILS NFR BLD AUTO: 8.53 10*3/MM3 (ref 1.7–7)
NRBC BLD AUTO-RTO: 0 /100 WBC (ref 0–0.2)
PLATELET # BLD AUTO: 217 10*3/MM3 (ref 140–450)
PMV BLD AUTO: 11.4 FL (ref 6–12)
RBC # BLD AUTO: 3.47 10*6/MM3 (ref 3.77–5.28)
WBC NRBC COR # BLD: 11.86 10*3/MM3 (ref 3.4–10.8)

## 2022-01-15 PROCEDURE — 85025 COMPLETE CBC W/AUTO DIFF WBC: CPT | Performed by: OBSTETRICS & GYNECOLOGY

## 2022-01-15 PROCEDURE — 0503F POSTPARTUM CARE VISIT: CPT | Performed by: OBSTETRICS & GYNECOLOGY

## 2022-01-15 RX ADMIN — HYDROCODONE BITARTRATE AND ACETAMINOPHEN 1 TABLET: 5; 325 TABLET ORAL at 18:55

## 2022-01-15 RX ADMIN — IBUPROFEN 800 MG: 800 TABLET, FILM COATED ORAL at 16:06

## 2022-01-15 RX ADMIN — Medication 1 TABLET: at 12:00

## 2022-01-15 RX ADMIN — IBUPROFEN 800 MG: 800 TABLET, FILM COATED ORAL at 08:37

## 2022-01-15 RX ADMIN — HYDROCODONE BITARTRATE AND ACETAMINOPHEN 1 TABLET: 5; 325 TABLET ORAL at 10:28

## 2022-01-15 RX ADMIN — FERROUS SULFATE TAB 325 MG (65 MG ELEMENTAL FE) 325 MG: 325 (65 FE) TAB at 08:37

## 2022-01-15 RX ADMIN — DOCUSATE SODIUM 100 MG: 100 CAPSULE, LIQUID FILLED ORAL at 08:37

## 2022-01-15 RX ADMIN — FERROUS SULFATE TAB 325 MG (65 MG ELEMENTAL FE) 325 MG: 325 (65 FE) TAB at 17:29

## 2022-01-15 NOTE — PROGRESS NOTES
Vital:   Vitals:    01/15/22 0816   BP: 118/77   Pulse: 76   Resp: 18   Temp: 98.3 °F (36.8 °C)   SpO2:    CC postpartum day 1 vaginal delivery. Doing well  Lochia scant  Episiotomy: Sutures healing well  Hemoglobin:      Recent Results (from the past 24 hour(s))   Postpartum RhIg Evaluation    Collection Time: 01/14/22  2:14 PM    Specimen: Blood   Result Value Ref Range    ABO Type O     RH type Negative    Fetal Bleed Screen    Collection Time: 01/14/22  2:14 PM    Specimen: Blood   Result Value Ref Range    Fetal Bleed Screen Negative    Doses of Rh Immune Globulin    Collection Time: 01/14/22  2:14 PM    Specimen: Blood   Result Value Ref Range    Number of Doses Fetal Screen Negative - Recommend 1 vial of RhIg    CBC Auto Differential    Collection Time: 01/15/22  8:36 AM    Specimen: Blood   Result Value Ref Range    WBC 11.86 (H) 3.40 - 10.80 10*3/mm3    RBC 3.47 (L) 3.77 - 5.28 10*6/mm3    Hemoglobin 11.1 (L) 12.0 - 15.9 g/dL    Hematocrit 33.3 (L) 34.0 - 46.6 %    MCV 96.0 79.0 - 97.0 fL    MCH 32.0 26.6 - 33.0 pg    MCHC 33.3 31.5 - 35.7 g/dL    RDW 13.1 12.3 - 15.4 %    RDW-SD 46.0 37.0 - 54.0 fl    MPV 11.4 6.0 - 12.0 fL    Platelets 217 140 - 450 10*3/mm3    Neutrophil % 72.0 42.7 - 76.0 %    Lymphocyte % 21.4 19.6 - 45.3 %    Monocyte % 4.6 (L) 5.0 - 12.0 %    Eosinophil % 1.2 0.3 - 6.2 %    Basophil % 0.5 0.0 - 1.5 %    Immature Grans % 0.3 0.0 - 0.5 %    Neutrophils, Absolute 8.53 (H) 1.70 - 7.00 10*3/mm3    Lymphocytes, Absolute 2.54 0.70 - 3.10 10*3/mm3    Monocytes, Absolute 0.55 0.10 - 0.90 10*3/mm3    Eosinophils, Absolute 0.14 0.00 - 0.40 10*3/mm3    Basophils, Absolute 0.06 0.00 - 0.20 10*3/mm3    Immature Grans, Absolute 0.04 0.00 - 0.05 10*3/mm3    nRBC 0.0 0.0 - 0.2 /100 WBC     Male baby-no circumcision desired.  Blood type: O   Neg. patient received RhoGAM postpartum  Rubella: IMMUNE   Impression  1.  Postpartum day 1 vaginal delivery doing well    Plan  1.  Probable discharge tomorrow.   Mother not desiring circumcision for baby

## 2022-01-16 VITALS
BODY MASS INDEX: 31.65 KG/M2 | TEMPERATURE: 98.5 F | SYSTOLIC BLOOD PRESSURE: 124 MMHG | DIASTOLIC BLOOD PRESSURE: 73 MMHG | OXYGEN SATURATION: 100 % | WEIGHT: 190 LBS | RESPIRATION RATE: 16 BRPM | HEIGHT: 65 IN | HEART RATE: 114 BPM

## 2022-01-16 PROCEDURE — 0503F POSTPARTUM CARE VISIT: CPT | Performed by: OBSTETRICS & GYNECOLOGY

## 2022-01-16 RX ORDER — HYDROCODONE BITARTRATE AND ACETAMINOPHEN 5; 325 MG/1; MG/1
1 TABLET ORAL EVERY 4 HOURS PRN
Qty: 16 TABLET | Refills: 0 | Status: SHIPPED | OUTPATIENT
Start: 2022-01-16 | End: 2022-01-21

## 2022-01-16 RX ORDER — IBUPROFEN 800 MG/1
800 TABLET ORAL EVERY 8 HOURS PRN
Qty: 40 TABLET | Refills: 1 | Status: SHIPPED | OUTPATIENT
Start: 2022-01-16

## 2022-01-16 RX ADMIN — HYDROCODONE BITARTRATE AND ACETAMINOPHEN 1 TABLET: 5; 325 TABLET ORAL at 08:17

## 2022-01-16 RX ADMIN — DOCUSATE SODIUM 100 MG: 100 CAPSULE, LIQUID FILLED ORAL at 00:53

## 2022-01-16 RX ADMIN — IBUPROFEN 800 MG: 800 TABLET, FILM COATED ORAL at 00:53

## 2022-01-16 RX ADMIN — DOCUSATE SODIUM 100 MG: 100 CAPSULE, LIQUID FILLED ORAL at 08:01

## 2022-01-16 RX ADMIN — HYDROCODONE BITARTRATE AND ACETAMINOPHEN 1 TABLET: 5; 325 TABLET ORAL at 13:02

## 2022-01-16 RX ADMIN — FERROUS SULFATE TAB 325 MG (65 MG ELEMENTAL FE) 325 MG: 325 (65 FE) TAB at 08:01

## 2022-01-16 RX ADMIN — Medication 1 TABLET: at 08:01

## 2022-01-16 NOTE — PLAN OF CARE
Problem: Adult Inpatient Plan of Care  Goal: Plan of Care Review  Outcome: Ongoing, Progressing  Flowsheets (Taken 1/16/2022 4325)  Progress: improving  Outcome Summary: VSS, bleeding and fundus wnl, pain controlled with motrin and an occasional norco, bottle feeding infant, anticipate discharge today   Goal Outcome Evaluation:           Progress: improving  Outcome Summary: VSS, bleeding and fundus wnl, pain controlled with motrin and an occasional norco, bottle feeding infant, anticipate discharge today

## 2022-01-16 NOTE — DISCHARGE SUMMARY
Date of Discharge:  1/16/2022    Discharge Diagnosis: Term vaginal delivery    Presenting Problem/History of Present Illness  Active Hospital Problems    Diagnosis  POA   • Pregnancy [Z34.90]  Not Applicable      Resolved Hospital Problems   No resolved problems to display.          Hospital Course  Patient is a 21 y.o. female presented with active labor and subsequent normal vaginal delivery.  Please see separate history and physical for details.  She underwent vaginal delivery without difficulty with normal blood loss.  She was ambulating, voiding, tolerating regular diet and stable for discharge home on postpartum day 2.  Her postdelivery hemoglobin was 11.1.  She is Rh- and was given RhoGAM prior to discharge..      Procedures Performed         Consults:   Consults     No orders found from 12/16/2021 to 1/15/2022.          Pertinent Test Results: labs: Postdelivery hemoglobin 11.1    Condition on Discharge: Stable    Vital Signs  Temp:  [97.9 °F (36.6 °C)-98.5 °F (36.9 °C)] 98.5 °F (36.9 °C)  Heart Rate:  [] 114  Resp:  [16] 16  BP: (103-124)/(61-73) 124/73    Physical Exam:     General Appearance:    Alert, cooperative, in no acute distress   Head:    Normocephalic, without obvious abnormality, atraumatic   Eyes:            Lids and lashes normal, conjunctivae and sclerae normal, no   icterus, no pallor, corneas clear, PERRLA   Ears:    Ears appear intact with no abnormalities noted   Throat:   No oral lesions, no thrush, oral mucosa moist   Neck:   No adenopathy, supple, trachea midline, no thyromegaly, no     carotid bruit, no JVD   Back:     No kyphosis present, no scoliosis present, no skin lesions,       erythema or scars, no tenderness to percussion or                   palpation,   range of motion normal   Lungs:     Clear to auscultation,respirations regular, even and                   unlabored    Heart:    Regular rhythm and normal rate, normal S1 and S2, no            murmur, no gallop, no  rub, no click   Breast Exam:    Deferred   Abdomen:     Normal bowel sounds, no masses, no organomegaly, soft        non-tender, non-distended, no guarding, no rebound                 tenderness   Genitalia:    Deferred   Extremities:   Moves all extremities well, no edema, no cyanosis, no              redness   Pulses:   Pulses palpable and equal bilaterally   Skin:   No bleeding, bruising or rash   Lymph nodes:   No palpable adenopathy   Neurologic:   Cranial nerves 2 - 12 grossly intact, sensation intact, DTR        present and equal bilaterally       Discharge Disposition  Home or Self Care    Discharge Medications     Discharge Medications      New Medications      Instructions Start Date   HYDROcodone-acetaminophen 5-325 MG per tablet  Commonly known as: NORCO   1 tablet, Oral, Every 4 Hours PRN      ibuprofen 800 MG tablet  Commonly known as: ADVIL,MOTRIN   800 mg, Oral, Every 8 Hours PRN         Continue These Medications      Instructions Start Date   ferrous sulfate 325 (65 FE) MG tablet   325 mg, Oral, Daily With Breakfast      Hydrocortisone (Perianal) 2.5 % rectal cream  Commonly known as: Anusol-HC   Apply rectally 2 times daily      Prenatal 28-0.8 MG tablet   1 tablet, Oral, Daily, Please use formulary or generic, with DHA ideal             Discharge Diet:     Activity at Discharge:     Follow-up Appointments  Future Appointments   Date Time Provider Department Center   1/19/2022  1:00 PM To Larose MD [facility]         Test Results Pending at Discharge       Christiano Trevizo MD  01/16/22  11:46 EST    Time: .

## 2022-01-16 NOTE — DISCHARGE INSTRUCTIONS
Routine vaginal delivery instructions.  Nothing in the vagina for 6 weeks.  Recommend follow-up appointment with Dr. Larose in 6 weeks.

## 2022-03-02 ENCOUNTER — TELEPHONE (OUTPATIENT)
Dept: OBSTETRICS AND GYNECOLOGY | Facility: CLINIC | Age: 22
End: 2022-03-02

## 2025-02-07 ENCOUNTER — TELEPHONE (OUTPATIENT)
Dept: OBSTETRICS AND GYNECOLOGY | Facility: CLINIC | Age: 25
End: 2025-02-07
Payer: COMMERCIAL

## 2025-02-07 NOTE — TELEPHONE ENCOUNTER
Attempted to call pt about n/s on 2/5/25, phone not accepting calls at this time. Letter mailed.alex